# Patient Record
Sex: MALE | Race: WHITE
[De-identification: names, ages, dates, MRNs, and addresses within clinical notes are randomized per-mention and may not be internally consistent; named-entity substitution may affect disease eponyms.]

---

## 2017-07-29 ENCOUNTER — HOSPITAL ENCOUNTER (EMERGENCY)
Dept: HOSPITAL 58 - ED | Age: 27
Discharge: HOME | End: 2017-07-29

## 2017-07-29 VITALS — BODY MASS INDEX: 23.7 KG/M2

## 2017-07-29 VITALS — DIASTOLIC BLOOD PRESSURE: 80 MMHG | SYSTOLIC BLOOD PRESSURE: 157 MMHG | TEMPERATURE: 99.8 F

## 2017-07-29 DIAGNOSIS — F17.210: ICD-10-CM

## 2017-07-29 DIAGNOSIS — K08.89: Primary | ICD-10-CM

## 2017-07-29 PROCEDURE — 99282 EMERGENCY DEPT VISIT SF MDM: CPT

## 2017-07-29 NOTE — ED.PDOC
General


ED Provider: 


Dr. PILAR DICKERSON





Chief Complaint: Tooth Problem


Stated Complaint: tooth pain


Time Seen by Physician: 17:01


Mode of Arrival: Walk-In


Information Source: Patient


Exam Limitations: No limitations


Nursing and Triage Documentation Reviewed and Agree: Yes





EENT Complaint Exam





- Dental/Oral Complaint/Exam


Mechanism of Injury: No known trauma


Symptoms Are: Still present


Timing: Constant


Initial Severity: Moderate


Current Severity: Moderate


Character: Reports: Aching


Aggravating: Reports: None


Alleviating: Reports: None


Associated Signs and Symptoms: Denies: Swelling, Discharge, Fever, Foul odor, 

Foul taste in mouth


Related History: Reports: Similar episode


Cardiac Risk Factors: Reports: None


Dental/Oral Surgical History: Reports: None


Tooth Findings: Present: Normal findings


Cervical Lymphadenopathy Present: No


Facial Swelling Present: No


Bleeding Present: No


Oropharynx Findings: Absent: Clots, Active bleeding


Septal Hematoma: No


Foreign Body Present: No


Dysphagia Present: No


Drooling Present: No


Asymmetrical Tonsillar Swelling Present: No


Uvula Midline: No


Katie-tonsillar Fluctuence: No


Trismus Present: No


Palatal Petechiae Present: No


Scarlatinaform Rash Present: No


Teeth Picture: 


  __________________________














  __________________________





 1 - pain decay








Review of Systems





- Review Of Systems


Constitutional: Reports: No symptoms


Eyes: Reports: No symptoms


Ears, Nose, Mouth, Throat: Reports: No symptoms


Respiratory: Reports: No symptoms


Cardiac: Reports: No symptoms


GI: Reports: No symptoms


: Reports: No symptoms


Musculoskeletal: Reports: No symptoms


Skin: Reports: No symptoms


Neurological: Reports: No symptoms


Endocrine: Reports: No symptoms


Hematologic/Lymphatic: Reports: No symptoms


All Other Systems: Reviewed and Negative





Past Medical History





- Past Medical History


Previously Healthy: Yes


Endocrine: Reports: None


Cardiovascular: Reports: None


Respiratory: Reports: None


Hematological: Reports: None


Gastrointestinal: Reports: None


Genitourinary: Reports: None


Neuro/Psych: Reports: None


Musculoskeletal: Reports: None


Cancer: Reports: None





- Surgical History


General Surgical History: Reports: None





- Family History


Family History: Reports: None





- Social History


Smoking Status: Current every day smoker, Light tobacco smoker


Hx Substance Use: No


Alcohol Screening: Occasionally





- Immunizations


Tetanus Shot up to Date: Yes





Physical Exam





- Physical Exam


Appearance: Well-appearing, No pain distress, Well-nourished


Eyes: DELANEY, EOMI, Conjunctiva clear


ENT: Ears normal, Nose normal, Oropharynx normal


Respiratory: Airway patent, Breath sounds clear, Breath sounds equal, 

Respirations nonlabored


Cardiovascular: RRR, Pulses normal, No rub, No murmur


GI/: Soft, Nontender, No masses, Bowel sounds normal, No Organomegaly


Musculoskeletal: Normal strength, ROM intact, No edema, No calf tenderness


Skin: Warm, Dry, Normal color


Neurological: Sensation intact, Motor intact, Reflexes intact, Cranial nerves 

intact, Alert, Oriented


Psychiatric: Affect appropriate, Mood appropriate





Critical Care Note





- Critical Care Note


Total Time (mins): 0





Course





- Course


Vital Signs: 





 











  Temp Pulse Resp BP Pulse Ox


 


 07/29/17 16:50  99.8 F H  119 H  20  157/80 H  97














Departure





- Departure


Time of Disposition: 17:04


Disposition: HOME SELF-CARE


Discharge Problem: 


 Toothache





Instructions:  Toothache (ED)


Condition: Good


Pt referred to PMD for follow-up: Yes


Additional Instructions: 


Please call your Family Physician as soon as possible to schedule a follow-up 

appointment.


Allergies/Adverse Reactions: 


Allergies





No Known Allergies Allergy (Verified 07/29/17 16:57)


 








Home Medications: 


Ambulatory Orders





1 [No Reported Medications]  08/07/15 








Disposition Discussed With: Patient

## 2018-02-06 ENCOUNTER — HOSPITAL ENCOUNTER (EMERGENCY)
Dept: HOSPITAL 58 - ED | Age: 28
Discharge: HOME | End: 2018-02-06

## 2018-02-06 VITALS — BODY MASS INDEX: 22.3 KG/M2

## 2018-02-06 VITALS — DIASTOLIC BLOOD PRESSURE: 82 MMHG | TEMPERATURE: 97.7 F | SYSTOLIC BLOOD PRESSURE: 125 MMHG

## 2018-02-06 DIAGNOSIS — L01.00: Primary | ICD-10-CM

## 2018-02-06 DIAGNOSIS — F17.210: ICD-10-CM

## 2018-02-06 PROCEDURE — 99283 EMERGENCY DEPT VISIT LOW MDM: CPT

## 2018-02-06 PROCEDURE — 87070 CULTURE OTHR SPECIMN AEROBIC: CPT

## 2018-02-06 PROCEDURE — 87186 SC STD MICRODIL/AGAR DIL: CPT

## 2018-02-06 NOTE — ED.PDOC
General


ED Provider: 


Dr. QUENTIN KULKARNI





Chief Complaint: Nose Injury


Stated Complaint: Soreness Lt Nares at upper lip.  Onset /first noticed small 

area yesterday. Awakened this morning and area of lt nares opening was sore and 

painful. Denies recent illness. Denies cough or respiratory congestion.


Time Seen by Physician: 12:45


Mode of Arrival: Walk-In


Information Source: Patient


Exam Limitations: No limitations


Nursing and Triage Documentation Reviewed and Agree: Yes


Reviewed sepsis parameters & appropriate labs ordered?: Yes


System Inflammatory Response Syndrome: Not Applicable


Sepsis Protocol: 


For patient's 13 years and over:





Temp is 96.8 and below  and greater


Pulse >90 BPM


Resp >20/minute


Acutely Altered Mental Status





Are patient's symptoms suggestive of a new infection, such as:


   -Pneumonia


   -Skin, Soft Tissue


   -Endocarditis


   -UTI


   -Bone, Joint Infection


   -Implantable Device


   -Acute Abdominal Infection


   -Wound Infection


   -Meningitis


   -Blood Stream Catheter Infection


   -Unknown





System Inflammatory Response Syndrome: Not Applicable





EENT Complaint Exam





- Nasal Complaint/Exam


Symptoms Are: Still present


Timing: Constant


Initial Severity: Mild


Current Severity: Moderate


Location: Left


Aggravating: Reports: None


Alleviating: Reports: None


Associated Signs and Symptoms: Reports: Nasal discharge.  Denies: Nasal 

congestion, Bruising


Related History: Denies: Similar episode


Nasal Surgical History: Reports: None


Foreign Body Present: No


Septal Hematoma: No


Differential Diagnoses: Other (Impetigo R/O MRSA)





Review of Systems





- Review Of Systems


Constitutional: Reports: No symptoms


Eyes: Reports: No symptoms


Ears, Nose, Mouth, Throat: Reports: Nose pain


Respiratory: Reports: No symptoms


Cardiac: Reports: No symptoms


GI: Reports: No symptoms


: Reports: No symptoms


Musculoskeletal: Reports: No symptoms


Skin: Reports: No symptoms


Neurological: Reports: No symptoms


Endocrine: Reports: No symptoms (Crusting lesion to upper lip adjacent to nares)


Hematologic/Lymphatic: Reports: No symptoms


All Other Systems: Reviewed and Negative





Past Medical History





- Past Medical History


Previously Healthy: Yes


Endocrine: Reports: None


Cardiovascular: Reports: None


Respiratory: Reports: None


Hematological: Reports: None


Gastrointestinal: Reports: None


Genitourinary: Reports: None


Neuro/Psych: Reports: None


Musculoskeletal: Reports: None


Cancer: Reports: None





- Surgical History


General Surgical History: Reports: None





- Family History


Family History: Reports: None





- Social History


Smoking Status: Current every day smoker, Heavy tobacco smoker


Hx Substance Use: No


Alcohol Screening: Occasionally





Physical Exam





- Physical Exam


Appearance: Well-appearing, No pain distress, Well-nourished


Ill-appearing: Mild


Pain Distress: Mild


Eyes: DELANEY, EOMI, Conjunctiva clear


ENT: Ears normal, Nose normal (yellow crusting lesion at entry to lt nares and 

on upper lip- minimal erythrema-appearance of impetigo-nostrils not affected)


Neck: Supple


Respiratory: Airway patent, Breath sounds clear, Breath sounds equal


Cardiovascular: RRR, Pulses normal, No rub, No murmur


GI/: Soft, Nontender, No masses, Bowel sounds normal


Musculoskeletal: Normal strength, ROM intact


Skin: Warm, Dry, Normal color


Neurological: Sensation intact, Motor intact


Psychiatric: Affect appropriate, Mood appropriate





Critical Care Note





- Critical Care Note


Total Time (mins): 0





Course





- Course


Vital Signs: 





 











  Temp Pulse Resp BP Pulse Ox


 


 02/06/18 11:39  97.7 F  79  20  125/82  97














Departure





- Departure


Time of Disposition: 13:40


Disposition: HOME SELF-CARE


Discharge Problem: 


 Impetigo





Condition: Good


Pt referred to PMD for follow-up: Yes


IPMP verified?: No


Additional Instructions: 


Instructed patient to keep area clean and dry, cleansing with warm soap and 

water


Apply Bactroban 2-3 times daily 


Take oral antibiotic as directed 


Follow up ER earlier if worsens


Prescriptions: 


Cephalexin 500 mg PO BID #10 tablet


Mupirocin Calcium [Bactroban Nasal] 1 gm MEGAN TID #1 gm


Allergies/Adverse Reactions: 


Allergies





No Known Allergies Allergy (Verified 02/06/18 11:44)


 








Home Medications: 


Ambulatory Orders





Cephalexin 500 mg PO BID #10 tablet 02/06/18 


Mupirocin Calcium [Bactroban Nasal] 1 gm MEGAN TID #1 gm 02/06/18 








Disposition Discussed With: Patient


Additional Information: 





Instructions on treatment here with hibiclens and antibiotic ointment.

## 2018-05-17 ENCOUNTER — HOSPITAL ENCOUNTER (EMERGENCY)
Dept: HOSPITAL 58 - ED | Age: 28
Discharge: HOME | End: 2018-05-17
Payer: COMMERCIAL

## 2018-05-17 VITALS — BODY MASS INDEX: 24.3 KG/M2

## 2018-05-17 VITALS — SYSTOLIC BLOOD PRESSURE: 127 MMHG | DIASTOLIC BLOOD PRESSURE: 79 MMHG | TEMPERATURE: 98 F

## 2018-05-17 DIAGNOSIS — M54.5: ICD-10-CM

## 2018-05-17 DIAGNOSIS — S99.921A: ICD-10-CM

## 2018-05-17 DIAGNOSIS — M79.641: ICD-10-CM

## 2018-05-17 DIAGNOSIS — S40.212A: ICD-10-CM
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
## 2018-05-17 DIAGNOSIS — M25.512: ICD-10-CM

## 2018-05-17 DIAGNOSIS — R51: ICD-10-CM

## 2018-05-17 DIAGNOSIS — S50.812A: ICD-10-CM

## 2018-05-17 DIAGNOSIS — M54.2: ICD-10-CM

## 2018-05-17 DIAGNOSIS — F17.210: ICD-10-CM

## 2018-05-17 DIAGNOSIS — S53.402A: Primary | ICD-10-CM

## 2018-05-17 DIAGNOSIS — R07.89: ICD-10-CM

## 2018-05-17 DIAGNOSIS — M79.605: ICD-10-CM

## 2018-05-17 PROCEDURE — 99283 EMERGENCY DEPT VISIT LOW MDM: CPT
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
## 2018-08-18 ENCOUNTER — HOSPITAL ENCOUNTER (EMERGENCY)
Age: 28
Discharge: HOME | End: 2018-08-18
Attending: EMERGENCY MEDICINE
Payer: MEDICAID

## 2018-08-18 VITALS
HEART RATE: 95 BPM | RESPIRATION RATE: 13 BRPM | BODY MASS INDEX: 22.96 KG/M2 | OXYGEN SATURATION: 95 % | SYSTOLIC BLOOD PRESSURE: 128 MMHG | HEIGHT: 69 IN | TEMPERATURE: 99 F | WEIGHT: 155 LBS | DIASTOLIC BLOOD PRESSURE: 81 MMHG

## 2018-08-18 DIAGNOSIS — F19.90 DRUG USE: Primary | ICD-10-CM

## 2018-08-18 LAB
ACETAMINOPHEN LEVEL: <15 UG/ML
ALBUMIN SERPL-MCNC: 4.5 G/DL (ref 3.5–5.2)
ALP BLD-CCNC: 55 U/L (ref 40–130)
ALT SERPL-CCNC: 25 U/L (ref 5–41)
ANION GAP SERPL CALCULATED.3IONS-SCNC: 11 MMOL/L (ref 7–19)
AST SERPL-CCNC: 29 U/L (ref 5–40)
BASOPHILS ABSOLUTE: 0.1 K/UL (ref 0–0.2)
BASOPHILS RELATIVE PERCENT: 0.9 % (ref 0–1)
BILIRUB SERPL-MCNC: 0.8 MG/DL (ref 0.2–1.2)
BUN BLDV-MCNC: 16 MG/DL (ref 6–20)
CALCIUM SERPL-MCNC: 9.1 MG/DL (ref 8.6–10)
CHLORIDE BLD-SCNC: 102 MMOL/L (ref 98–111)
CO2: 25 MMOL/L (ref 22–29)
CREAT SERPL-MCNC: 1.2 MG/DL (ref 0.5–1.2)
EOSINOPHILS ABSOLUTE: 0.1 K/UL (ref 0–0.6)
EOSINOPHILS RELATIVE PERCENT: 0.9 % (ref 0–5)
ETHANOL: <10 MG/DL (ref 0–0.08)
GFR NON-AFRICAN AMERICAN: >60
GLUCOSE BLD-MCNC: 91 MG/DL (ref 74–109)
HCT VFR BLD CALC: 43.3 % (ref 42–52)
HEMOGLOBIN: 14.6 G/DL (ref 14–18)
LYMPHOCYTES ABSOLUTE: 3.1 K/UL (ref 1.1–4.5)
LYMPHOCYTES RELATIVE PERCENT: 38.3 % (ref 20–40)
MCH RBC QN AUTO: 31.2 PG (ref 27–31)
MCHC RBC AUTO-ENTMCNC: 33.7 G/DL (ref 33–37)
MCV RBC AUTO: 92.5 FL (ref 80–94)
MONOCYTES ABSOLUTE: 0.9 K/UL (ref 0–0.9)
MONOCYTES RELATIVE PERCENT: 11.6 % (ref 0–10)
NEUTROPHILS ABSOLUTE: 3.9 K/UL (ref 1.5–7.5)
NEUTROPHILS RELATIVE PERCENT: 48.2 % (ref 50–65)
PDW BLD-RTO: 13.1 % (ref 11.5–14.5)
PLATELET # BLD: 296 K/UL (ref 130–400)
PMV BLD AUTO: 10.1 FL (ref 9.4–12.4)
POTASSIUM SERPL-SCNC: 4.2 MMOL/L (ref 3.5–5)
RBC # BLD: 4.68 M/UL (ref 4.7–6.1)
SALICYLATE, SERUM: <3 MG/DL (ref 3–10)
SODIUM BLD-SCNC: 138 MMOL/L (ref 136–145)
TOTAL PROTEIN: 7 G/DL (ref 6.6–8.7)
WBC # BLD: 8 K/UL (ref 4.8–10.8)

## 2018-08-18 PROCEDURE — 85025 COMPLETE CBC W/AUTO DIFF WBC: CPT

## 2018-08-18 PROCEDURE — 36415 COLL VENOUS BLD VENIPUNCTURE: CPT

## 2018-08-18 PROCEDURE — 99284 EMERGENCY DEPT VISIT MOD MDM: CPT

## 2018-08-18 PROCEDURE — G0480 DRUG TEST DEF 1-7 CLASSES: HCPCS

## 2018-08-18 PROCEDURE — 99285 EMERGENCY DEPT VISIT HI MDM: CPT | Performed by: EMERGENCY MEDICINE

## 2018-08-18 PROCEDURE — 80053 COMPREHEN METABOLIC PANEL: CPT

## 2018-08-18 PROCEDURE — 93005 ELECTROCARDIOGRAM TRACING: CPT

## 2018-08-18 ASSESSMENT — ENCOUNTER SYMPTOMS
NAUSEA: 0
VOMITING: 0
CHEST TIGHTNESS: 0
BACK PAIN: 0
TROUBLE SWALLOWING: 0
WHEEZING: 0
DIARRHEA: 0
ABDOMINAL PAIN: 0
COLOR CHANGE: 0
CONSTIPATION: 0
EYE PAIN: 0
PHOTOPHOBIA: 0
SINUS PRESSURE: 0
SHORTNESS OF BREATH: 0

## 2018-08-18 ASSESSMENT — PAIN SCALES - GENERAL: PAINLEVEL_OUTOF10: 7

## 2018-08-18 ASSESSMENT — PAIN DESCRIPTION - FREQUENCY: FREQUENCY: CONTINUOUS

## 2018-08-18 ASSESSMENT — PAIN DESCRIPTION - PAIN TYPE: TYPE: ACUTE PAIN

## 2018-08-18 NOTE — ED NOTES
Bed: 09  Expected date:   Expected time:   Means of arrival:   Comments:  Charles Madison RN  08/18/18 [id number]
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
## 2018-08-18 NOTE — ED PROVIDER NOTES
[facility]  eMERGENCY dEPARTMENT eNCOUnter      Pt Name: Greta Oneal  MRN: [id number]  [place]arlagfzaira 1990  Date of evaluation: 8/18/2018  Provider: Rajinder Zavaleta MD    CHIEF COMPLAINT       Chief Complaint   Patient presents with    Drug Overdose         HISTORY OF PRESENT ILLNESS   (Location/Symptom, Timing/Onset, Context/Setting, Quality, Duration, Modifying Factors, Severity)  Note limiting factors. Greta Oneal is a 29 y.o. male who presents to the emergency department for tells me that he did some meth early yesterday. He felt guilty for doing it the fist time. He then called a friend who then gave him some valium to help calm him down. Pt then became lethargic at home and GF called EMS. Pt states that he feels fine at this time and would like to go home. He was not trying to hurt himself. He is not having compaints at this time. HPI    Nursing Notes were reviewed. REVIEW OF SYSTEMS    (2-9 systems for level 4, 10 or more for level 5)     Review of Systems   Constitutional: Negative for fatigue and fever. HENT: Negative for congestion, drooling, sinus pressure and trouble swallowing. Eyes: Negative for photophobia, pain and visual disturbance. Respiratory: Negative for chest tightness, shortness of breath and wheezing. Cardiovascular: Negative for chest pain, palpitations and leg swelling. Gastrointestinal: Negative for abdominal pain, constipation, diarrhea, nausea and vomiting. Genitourinary: Negative for dysuria, flank pain and urgency. Musculoskeletal: Negative for arthralgias, back pain, myalgias, neck pain and neck stiffness. Skin: Negative for color change, pallor and rash. Neurological: Negative for dizziness, facial asymmetry, speech difficulty, weakness, light-headedness, numbness and headaches. Psychiatric/Behavioral: Negative for agitation, confusion, hallucinations and suicidal ideas. The patient is not nervous/anxious.              PAST MEDICAL mood appears anxious. His speech is rapid and/or pressured and tangential. He is hyperactive. Cognition and memory are not impaired. He expresses impulsivity. Nursing note and vitals reviewed. DIAGNOSTIC RESULTS     EKG: All EKG's are interpreted by the Emergency Department Physician who either signs or Co-signs this chart in the absence of a cardiologist.    Sinus tachycardia with a rate of 100, normal axis, no acute ST elevations or depressions noted. RADIOLOGY:   Non-plain film images such as CT, Ultrasound and MRI are read by the radiologist. Plain radiographic images are visualized and preliminarily interpreted by the emergency physician with the below findings:          No orders to display           LABS:  Labs Reviewed   CBC WITH AUTO DIFFERENTIAL - Abnormal; Notable for the following:        Result Value    RBC 4.68 (*)     MCH 31.2 (*)     Neutrophils % 48.2 (*)     Monocytes % 11.6 (*)     All other components within normal limits   ACETAMINOPHEN LEVEL   COMPREHENSIVE METABOLIC PANEL   ETHANOL   SALICYLATE LEVEL   URINE DRUG SCREEN   URINE RT REFLEX TO CULTURE       All other labs were within normal range or not returned as of this dictation. EMERGENCY DEPARTMENT COURSE and DIFFERENTIAL DIAGNOSIS/MDM:   Vitals:    Vitals:    08/18/18 0137   BP: 128/81   Pulse: 95   Resp: 13   Temp: 99 °F (37.2 °C)   TempSrc: Temporal   SpO2: 95%   Weight: 155 lb (70.3 kg)   Height: 5' 9\" (1.753 m)       MDM  Number of Diagnoses or Management Options  Drug use:   Diagnosis management comments: Patient tells me he feels better and would like to leave. He is a little anxious, but he is afraid that we are going to call PD on him since he is on parole. I explained to him that that was not going to happen. He has no complaints at this time. He has a little anxious and tangential, but he used methamphetamines. He is not homicidal or suicidal. He has no danger to himself or others.     The patient can be

## 2018-08-22 LAB
EKG P AXIS: 75 DEGREES
EKG P-R INTERVAL: 132 MS
EKG Q-T INTERVAL: 330 MS
EKG QRS DURATION: 86 MS
EKG QTC CALCULATION (BAZETT): 398 MS
EKG T AXIS: 49 DEGREES

## 2018-09-17 NOTE — ED.PDOC
General


ED Provider: 


Dr. PILAR DICKERSON





Chief Complaint: Respiratory Complaint


Stated Complaint: cough, wheezing


Time Seen by Physician: 17:25 (seen with vania at all times smokes 1/2 pk/day)


Mode of Arrival: Walk-In


Information Source: Patient


Exam Limitations: No limitations


Nursing and Triage Documentation Reviewed and Agree: Yes


Does patient meet sepsis criteria?: No


System Inflammatory Response Syndrome: Not Applicable


Sepsis Protocol: 


For patient's 13 years and over:





Temp is 96.8 and below  and greater


Pulse >90 BPM


Resp >20/minute


Acutely Altered Mental Status





Are patient's symptoms suggestive of a new infection, such as:


   -Pneumonia


   -Skin, Soft Tissue


   -Endocarditis


   -UTI


   -Bone, Joint Infection


   -Implantable Device


   -Acute Abdominal Infection


   -Wound Infection


   -Meningitis


   -Blood Stream Catheter Infection


   -Unknown








Respiratory Complaint Exam





- Respiratory Complaint/Exam


Symptoms Are: Still present


Timing: Intermittent


Initial Severity: Mild


Current Severity: Mild


Location: Nose, Throat, Chest


Character: Reports: Non-productive cough, Dry cough


Aggravating: Reports: URI


Associated Signs and Symptoms: Denies: Rapid breathing, Dyspnea, Fever, Chills, 

Chest pain, Pleuritic chest pain, Wheezing, Hemoptysis, Dizziness, Calf pain, 

Calf swelling, Edema, URI, Nasal congestion, Hoarseness, Sinus discomfort, 

Vomiting, Sore throat, Weight loss, Decreased oral intake, Increased thirst, 

Increased appetite, Increased urination


Related History: Reports: Similar episode


History of Healthcare-Acquired Pneumonia: No


Related Surgical History: Reports: None


Pulmonary Embolism Risk Factors: None


Cardiac Risk Factors: Reports: None


Pseudomonas Risk Factors: Reports: None.  Denies: Chronic steriod use


Tuberculosis Risk Factors: Reports: None


Status Asthmaticus Risk Factors: Reports: None


Home Oxygen Use: No


Recent Stress Test: No


Recent Echo/LV Function: No


Current Antibiotic Use: No


Current Asthma Medication Use: No


Respiratory Distress: None


Inadequate Respiratory Effort: No


Dysphagia Present: No


Stridor Present: No


JVD Present: No


Accessory Muscle Use: No


Retractions: Not Present


Diminished Breath Sounds: No


Sinus Tenderness: None


Grunting Respirations: No


Kussmaul Respirations: No


Differential Diagnoses: Pneumonia, Bronchitis





Review of Systems





- Review Of Systems


Constitutional: Reports: No symptoms


Eyes: Reports: No symptoms


Ears, Nose, Mouth, Throat: Reports: No symptoms


Respiratory: Reports: Cough


Cardiac: Reports: No symptoms


GI: Reports: No symptoms


: Reports: No symptoms


Musculoskeletal: Reports: No symptoms


Skin: Reports: No symptoms


Neurological: Reports: No symptoms


Endocrine: Reports: No symptoms


Hematologic/Lymphatic: Reports: No symptoms


All Other Systems: Reviewed and Negative





Past Medical History





- Past Medical History


Previously Healthy: Yes


Endocrine: Reports: None


Cardiovascular: Reports: None


Respiratory: Reports: None


Hematological: Reports: None


Gastrointestinal: Reports: None


Genitourinary: Reports: None


Neuro/Psych: Reports: None


Musculoskeletal: Reports: None


Cancer: Reports: None





- Surgical History


General Surgical History: Reports: None





- Family History


Family History: Reports: None





- Social History


Smoking Status: Current every day smoker, Light tobacco smoker


Hx Substance Use: No


Alcohol Screening: None





Physical Exam





- Physical Exam


Appearance: Well-appearing, No pain distress, Well-nourished


Eyes: DELANEY, EOMI, Conjunctiva clear


ENT: Ears normal, Nose normal, Oropharynx normal


Respiratory: Airway patent, Breath sounds clear, Breath sounds equal, 

Respirations nonlabored


Cardiovascular: RRR, Pulses normal, No rub, No murmur


GI/: Soft, Nontender, No masses, Bowel sounds normal, No Organomegaly


Musculoskeletal: Normal strength, ROM intact, No edema, No calf tenderness


Skin: Warm, Dry, Normal color


Neurological: Sensation intact, Motor intact, Reflexes intact, Cranial nerves 

intact, Alert, Oriented


Psychiatric: Affect appropriate, Mood appropriate





Interpretation





- Radiology Interpretation


Radiology Interpretation By: ED Physician


Radiology Results: Negative


Exam Interpreted: CXR (for pneumonia)





Critical Care Note





- Critical Care Note


Total Time (mins): 0





Course





- Course


Orders, Labs, Meds: 





Orders











 Category Date Time Status


 


 CHEST, 2 VIEWS PA & LAT Stat RADS  09/17/18 17:38 Ordered











Vital Signs: 





 











  Temp Pulse Resp BP Pulse Ox


 


 09/17/18 17:22  99.3 F  99 H  16  124/78  95














Departure





- Departure


Time of Disposition: 19:00


Disposition: HOME SELF-CARE


Discharge Problem: 


 Bronchitis





Instructions:  Acute Bronchitis (ED), Wheezing (ED), How Your Lungs Work (ED), 

Effects of Smoking, Alcohol, and Medicines on Breastfeeding (ED), How to Stop 

Smoking (ED), Secondhand Smoke Exposure in Children (ED)


Condition: Good


Pt referred to PMD for follow-up: Yes


IPMP verified?: No


Additional Instructions: 


Please call your Family Physician as soon as possible to schedule a follow-up 

appointment.


Allergies/Adverse Reactions: 


Allergies





No Known Allergies Allergy (Verified 09/17/18 17:29)


 








Home Medications: 


Ambulatory Orders





1 [No Reported Medications]  05/17/18

## 2018-10-19 NOTE — ED.PDOC
General


ED Provider: 


Dr. RUDY ALAMO





Chief Complaint: Tooth Problem


Stated Complaint: Patient is a 28 year old male who started having severe right 

lower molar tooth ache took aspirin but has not improved. Does not have a 

dentist.


Time Seen by Physician: 00:01


Mode of Arrival: Walk-In


Information Source: Patient


Seen Within Last 72 Hours for Same Complaint By: ED


Nursing and Triage Documentation Reviewed and Agree: Yes


Does patient meet sepsis criteria?: No


System Inflammatory Response Syndrome: Not Applicable


Sepsis Protocol: 


For patient's 13 years and over:





Temp is 96.8 and below  and greater


Pulse >90 BPM


Resp >20/minute


Acutely Altered Mental Status





Are patient's symptoms suggestive of a new infection, such as:


   -Pneumonia


   -Skin, Soft Tissue


   -Endocarditis


   -UTI


   -Bone, Joint Infection


   -Implantable Device


   -Acute Abdominal Infection


   -Wound Infection


   -Meningitis


   -Blood Stream Catheter Infection


   -Unknown








EENT Complaint Exam





- Dental/Oral Complaint/Exam


Mechanism of Injury: No known trauma


Onset/Duration: 3 days 


Symptoms Are: Still present


Timing: Constant


Initial Severity: Severe


Current Severity: Severe


Location: right lower molar


Character: Reports: Aching, Throbbing


Aggravating: Reports: Heat, Cold, Chewing


Associated Signs and Symptoms: Reports: Swelling, Discharge, Foul odor, Foul 

taste in mouth


Tooth Findings: Present: Gross decay, Gross caries, Abcess


Cervical Lymphadenopathy Present: Yes





Review of Systems





- Review Of Systems


Constitutional: Reports: No symptoms


Eyes: Reports: No symptoms


Ears, Nose, Mouth, Throat: Reports: Mouth pain, Mouth swelling


Respiratory: Reports: No symptoms


Cardiac: Reports: No symptoms


GI: Reports: No symptoms


: Reports: No symptoms


Musculoskeletal: Reports: No symptoms


Skin: Reports: No symptoms


Neurological: Reports: No symptoms


Endocrine: Reports: No symptoms


Hematologic/Lymphatic: Reports: No symptoms


All Other Systems: Reviewed and Negative





Past Medical History





- Past Medical History


Previously Healthy: Yes


Endocrine: Reports: None


Cardiovascular: Reports: None


Respiratory: Reports: None


Hematological: Reports: None


Gastrointestinal: Reports: None


Genitourinary: Reports: None


Neuro/Psych: Reports: None


Musculoskeletal: Reports: Joint Pain (INFECTION RIGHT THUMB)


Cancer: Reports: None


Other Pertinent Past Medical History: BOXERS FRACTURES TO BOTH HANDS





- Surgical History


General Surgical History: Reports: None





- Family History


Family History: Reports: None





- Social History


Smoking Status: Current every day smoker, Light tobacco smoker


Hx Substance Use: No


Alcohol Screening: None





- Immunizations


Tetanus Shot up to Date: Yes





Physical Exam





- Physical Exam


Appearance: Ill-appearing


Ill-appearing: Mild


Pain Distress: Severe


Eyes: DELANEY, EOMI, Conjunctiva clear


ENT: Ears normal, Nose normal, Oropharynx normal


Neck: Supple


Respiratory: Airway patent, Breath sounds clear, Breath sounds equal, 

Respirations nonlabored


Cardiovascular: RRR, Pulses normal, No rub, No murmur


Skin: Warm, Dry, Normal color


Neurological: Sensation intact, Motor intact, Reflexes intact, Cranial nerves 

intact, Alert, Oriented


Psychiatric: Anxious





Critical Care Note





- Critical Care Note


Total Time (mins): 0





Course





- Course


Orders, Labs, Meds: 





Orders











 Category Date Time Status


 


 Clindamycin HCl [Cleocin] MEDS  10/19/18 00:01 Stat





 300 mg PO ONCE STA   


 


 Ketorolac Tromethamine [Toradol] MEDS  10/19/18 00:01 Stat





 60 mg IM ONCE STA   











Vital Signs: 





 











  Temp Pulse Resp BP Pulse Ox


 


 10/18/18 23:18  98.6 F  87  20  134/83  97














Departure





- Departure


Time of Disposition: 00:40


Disposition: HOME SELF-CARE


Discharge Problem: 


 Dental abscess, Toothache





Instructions:  Dental Abscess (ED)


Condition: Fair


Pt referred to PMD for follow-up: Yes


IPMP verified?: No


Additional Instructions: 


Take medications as prescribed 


Follow up with PCP in 3 days 





Prescriptions: 


Hydrocodone Bit/Acetaminophen [Norco 5-325] 1 each PO Q6HR PRN #15 tablet


 PRN Reason: severe pain 


Clindamycin HCl 300 mg PO TID #30 capsule


Ibuprofen [Motrin] 600 mg PO Q6H PRN #30 tablet


 PRN Reason: Analgesia


Allergies/Adverse Reactions: 


Allergies





No Known Allergies Allergy (Verified 10/18/18 23:14)


 








Home Medications: 


Ambulatory Orders





Clindamycin HCl 300 mg PO TID #30 capsule 10/19/18 


Hydrocodone Bit/Acetaminophen [Norco 5-325] 1 each PO Q6HR PRN #15 tablet 10/19/

18 


Ibuprofen [Motrin] 600 mg PO Q6H PRN #30 tablet 10/19/18 








Disposition Discussed With: Patient

## 2018-11-01 NOTE — ED.PDOC
General


ED Provider: 


Dr. RUDY ALAMO





Chief Complaint: Tooth Problem


Stated Complaint: Patient returns with similar dental pain. Has not seen a 

dentist. Does not have a Primary care doctor. Finished antibiotics given 

recently


Time Seen by Physician: 21:54


Mode of Arrival: Walk-In


Information Source: Patient


Exam Limitations: No limitations


Nursing and Triage Documentation Reviewed and Agree: Yes


Does patient meet sepsis criteria?: No


System Inflammatory Response Syndrome: Not Applicable


Sepsis Protocol: 


For patient's 13 years and over:





Temp is 96.8 and below  and greater


Pulse >90 BPM


Resp >20/minute


Acutely Altered Mental Status





Are patient's symptoms suggestive of a new infection, such as:


   -Pneumonia


   -Skin, Soft Tissue


   -Endocarditis


   -UTI


   -Bone, Joint Infection


   -Implantable Device


   -Acute Abdominal Infection


   -Wound Infection


   -Meningitis


   -Blood Stream Catheter Infection


   -Unknown








EENT Complaint Exam





- Dental/Oral Complaint/Exam


Mechanism of Injury: No known trauma


Onset/Duration: 2 weeks 


Symptoms Are: Still present


Timing: Constant


Initial Severity: Severe


Current Severity: Severe


Character: Reports: Dull, Aching, Throbbing


Aggravating: Reports: Heat, Cold, Chewing


Associated Signs and Symptoms: Denies: Swelling, Discharge, Fever, Foul odor, 

Foul taste in mouth


Related History: Reports: Similar episode


Cardiac Risk Factors: Reports: None


Dental/Oral Surgical History: Reports: None


Tooth Findings: Present: Gross decay, Dental fracture


Facial Swelling Present: No


Bleeding Present: No


Oropharynx Findings: Absent: Clots, Active bleeding


Septal Hematoma: No


Foreign Body Present: No


Dysphagia Present: No


Drooling Present: No


Asymmetrical Tonsillar Swelling Present: No


Uvula Midline: No


Katie-tonsillar Fluctuence: No


Trismus Present: No


Palatal Petechiae Present: No


Scarlatinaform Rash Present: No


Lesions: Absent: Lip, Gums, Tongue, Buccal Mucosa, Pharynx


Exanthem: Absent: Lip, Gums, Tongue, Buccal Mucosa, Pharynx


Vesicles: Absent: Lip, Gums, Tongue, Buccal Mucosa, Pharynx


Teeth Picture: 


  __________________________














  __________________________





 1 - tenderness





 2 - fractured partially





Differential Diagnoses: Dental Caries, Fractured Tooth





Review of Systems





- Review Of Systems


Constitutional: Reports: No symptoms


Eyes: Reports: No symptoms


Ears, Nose, Mouth, Throat: Reports: Mouth pain


Respiratory: Reports: No symptoms


Cardiac: Reports: No symptoms


GI: Reports: No symptoms


: Reports: No symptoms


Musculoskeletal: Reports: No symptoms


Skin: Reports: No symptoms


Neurological: Reports: No symptoms


Endocrine: Reports: No symptoms


Hematologic/Lymphatic: Reports: No symptoms


All Other Systems: Reviewed and Negative





Past Medical History





- Past Medical History


Previously Healthy: Yes


Endocrine: Reports: None


Cardiovascular: Reports: None


Respiratory: Reports: None


Hematological: Reports: None


Gastrointestinal: Reports: None


Genitourinary: Reports: None


Neuro/Psych: Reports: None


Musculoskeletal: Reports: Joint Pain (INFECTION RIGHT THUMB)


Cancer: Reports: None


Other Pertinent Past Medical History: BOXERS FRACTURES TO BOTH HANDS





- Surgical History


General Surgical History: Reports: None





- Family History


Family History: Reports: None





- Social History


Smoking Status: Current every day smoker, Light tobacco smoker


Hx Substance Use: No


Alcohol Screening: None





- Immunizations


Tetanus Shot up to Date: Yes





Physical Exam





- Physical Exam


Appearance: Ill-appearing


Ill-appearing: Mild


Pain Distress: Severe


Eyes: DELANEY, EOMI, Conjunctiva clear


Neck: Supple


Respiratory: Airway patent, Breath sounds clear, Breath sounds equal, 

Respirations nonlabored


Cardiovascular: RRR, Pulses normal, No rub, No murmur


GI/: Soft, Nontender, No masses, Bowel sounds normal, No Organomegaly


Psychiatric: Anxious





Critical Care Note





- Critical Care Note


Total Time (mins): 0





Course





- Course


Vital Signs: 





 











  Temp Pulse Resp BP Pulse Ox


 


 11/01/18 21:43  98.6 F  83  20  138/80  97














Departure





- Departure


Time of Disposition: 22:05


Disposition: HOME SELF-CARE


Discharge Problem: 


 Toothache





Instructions:  Toothache (ED)


Condition: Stable


Pt referred to PMD for follow-up: Yes


IPMP verified?: No


Additional Instructions: 


Take medications as prescribed 


Follow up with the dentist in 2 days 


Prescriptions: 


Ibuprofen 800 mg PO TID #60 tablet


Allergies/Adverse Reactions: 


Allergies





No Known Allergies Allergy (Verified 11/01/18 21:48)


 








Home Medications: 


Ambulatory Orders





Ibuprofen [Motrin] 600 mg PO Q6H PRN #30 tablet 10/19/18 


Ibuprofen 800 mg PO TID #60 tablet 11/01/18 








Disposition Discussed With: Patient

## 2018-11-10 NOTE — ED.PDOC
General


ED Provider: 


Dr. RUDY ALAMO





Chief Complaint: Respiratory Complaint


Stated Complaint: Pateint is a 28 year old male who has upper respratory 

complaints, states the wife has the same thing and she is at home. He is a 

smoker and has a bad molar and sinus conjestion.


Time Seen by Physician: 14:09


Mode of Arrival: Walk-In


Information Source: Patient


Exam Limitations: No limitations


Primary Care Provider: 


ERA STUBBS





Nursing and Triage Documentation Reviewed and Agree: Yes


Does patient meet sepsis criteria?: No


System Inflammatory Response Syndrome: Not Applicable


Sepsis Protocol: 


For patient's 13 years and over:





Temp is 96.8 and below  and greater


Pulse >90 BPM


Resp >20/minute


Acutely Altered Mental Status





Are patient's symptoms suggestive of a new infection, such as:


   -Pneumonia


   -Skin, Soft Tissue


   -Endocarditis


   -UTI


   -Bone, Joint Infection


   -Implantable Device


   -Acute Abdominal Infection


   -Wound Infection


   -Meningitis


   -Blood Stream Catheter Infection


   -Unknown








Respiratory Complaint Exam





- Respiratory Complaint/Exam


Onset/Duration: 2 days


Symptoms Are: Still present


Timing: Constant


Initial Severity: Moderate


Current Severity: Moderate


Location: Chest


Character: Reports: Productive cough


Aggravating: Reports: URI, Weather


Related Surgical History: Reports: None


Pulmonary Embolism Risk Factors: None


Cardiac Risk Factors: Reports: None


Pseudomonas Risk Factors: Reports: None


Tuberculosis Risk Factors: Reports: None


Status Asthmaticus Risk Factors: Reports: None


Home Oxygen Use: No


Recent Stress Test: No


Recent Echo/LV Function: No


Current Antibiotic Use: No


Current Asthma Medication Use: No


Respiratory Distress: None


Inadequate Respiratory Effort: No


Dysphagia Present: No


Stridor Present: No


JVD Present: No


Accessory Muscle Use: No


Retractions: Not Present


Diminished Breath Sounds: No


Sinus Tenderness: Maxillary


Grunting Respirations: No


Kussmaul Respirations: No


Differential Diagnoses: Bronchitis, Other (sinusitis )





Review of Systems





- Review Of Systems


Constitutional: Reports: No symptoms


Eyes: Reports: No symptoms


Respiratory: Reports: Cough


Cardiac: Reports: No symptoms


GI: Reports: No symptoms


: Reports: No symptoms


Musculoskeletal: Reports: No symptoms


Neurological: Reports: Anxiety


Endocrine: Reports: No symptoms


All Other Systems: Reviewed and Negative





Past Medical History





- Past Medical History


Previously Healthy: Yes


Endocrine: Reports: None


Cardiovascular: Reports: None


Respiratory: Reports: None


Hematological: Reports: None


Gastrointestinal: Reports: None


Genitourinary: Reports: None


Neuro/Psych: Reports: None


Musculoskeletal: Reports: Joint Pain (INFECTION RIGHT THUMB)


Cancer: Reports: None


Other Pertinent Past Medical History: BOXERS FRACTURES TO BOTH HANDS





- Surgical History


General Surgical History: Reports: None





- Family History


Family History: Reports: None





- Social History


Smoking Status: Current every day smoker, Light tobacco smoker


Hx Substance Use: No


Alcohol Screening: None





Physical Exam





- Physical Exam


Appearance: Ill-appearing


Ill-appearing: Mild


Eyes: DELANEY, EOMI, Conjunctiva clear


ENT: Ears normal, Nose normal, Oropharynx normal


Neck: Supple


Respiratory: Breath sounds diminished, Wheezes (mild on the left )


Musculoskeletal: Normal strength


Skin: Warm, Dry, Normal color


Neurological: Sensation intact, Motor intact, Reflexes intact, Cranial nerves 

intact, Alert, Oriented


Psychiatric: Anxious





Critical Care Note





- Critical Care Note


Total Time (mins): 0





Course





- Course


Vital Signs: 


 











  Temp Pulse Resp BP Pulse Ox


 


 11/10/18 13:55  97.1 F L  77  18  160/72 H  98














Departure





- Departure


Time of Disposition: 14:31


Disposition: HOME SELF-CARE


Discharge Problem: 


Acute maxillary sinusitis, unspecified


Qualifiers:


 Recurrence: non-recurrent Qualified Code(s): J01.00 - Acute maxillary sinusitis

, unspecified





Instructions:  Sinusitis (ED)


Condition: Stable


Pt referred to PMD for follow-up: Yes


IPMP verified?: No


Additional Instructions: 


Take medications as prescribed. 


Follow up with PCP in 3 days


Quit smoking. 


Prescriptions: 


Azithromycin [Zithromax] 250 mg PO AS DIRECTED #6 tablet


Prednisone 20 mg PO DAILYWM #5 tablet


Allergies/Adverse Reactions: 


Allergies





No Known Allergies Allergy (Verified 11/10/18 14:02)


 








Home Medications: 


Ambulatory Orders





Azithromycin [Zithromax] 250 mg PO AS DIRECTED #6 tablet 11/10/18 


Ibuprofen [Motrin] 800 mg PO Q6H 11/10/18 


Prednisone 20 mg PO DAILYWM #5 tablet 11/10/18 








Disposition Discussed With: Patient

## 2019-03-11 NOTE — ED.PDOC
General


ED Provider: 


Dr. QUENTIN HANSEN-ER





Chief Complaint: Tooth Problem


Stated Complaint: my gums are swollen---i need some antbx


Time Seen by Physician: 22:54


Mode of Arrival: Walk-In


Information Source: Patient


Exam Limitations: No limitations


Primary Care Provider: 


ERA STUBBS





Nursing and Triage Documentation Reviewed and Agree: Yes


Does patient meet sepsis criteria?: No


System Inflammatory Response Syndrome: Not Applicable


Sepsis Protocol: 


For patient's 13 years and over:





Temp is 96.8 and below  and greater


Pulse >90 BPM


Resp >20/minute


Acutely Altered Mental Status





Are patient's symptoms suggestive of a new infection, such as:


   -Pneumonia


   -Skin, Soft Tissue


   -Endocarditis


   -UTI


   -Bone, Joint Infection


   -Implantable Device


   -Acute Abdominal Infection


   -Wound Infection


   -Meningitis


   -Blood Stream Catheter Infection


   -Unknown








EENT Complaint Exam





- Dental/Oral Complaint/Exam


Mechanism of Injury: Unknown


Onset/Duration: 3 days


Symptoms Are: Still present


Timing: Constant


Initial Severity: Mild


Current Severity: Moderate


Location: right upper premolar


Character: Reports: Dull, Aching, Throbbing


Aggravating: Reports: Heat, Cold, Chewing


Associated Signs and Symptoms: Reports: Swelling


Cervical Lymphadenopathy Present: No


Facial Swelling Present: No


Bleeding Present: No


Septal Hematoma: No


Foreign Body Present: No


Dysphagia Present: No


Drooling Present: No


Asymmetrical Tonsillar Swelling Present: No


Uvula Midline: Yes


Katie-tonsillar Fluctuence: No


Trismus Present: No


Palatal Petechiae Present: No


Scarlatinaform Rash Present: No


Differential Diagnoses: Dental Abcess, Dental Caries





Review of Systems





- Review Of Systems


Constitutional: Reports: No symptoms


Eyes: Reports: No symptoms


Ears, Nose, Mouth, Throat: Reports: Mouth pain, Mouth swelling


Respiratory: Reports: No symptoms


Cardiac: Reports: No symptoms


GI: Reports: No symptoms


: Reports: No symptoms


Musculoskeletal: Reports: No symptoms


Skin: Reports: No symptoms


Neurological: Reports: No symptoms


Endocrine: Reports: No symptoms


Hematologic/Lymphatic: Reports: No symptoms


All Other Systems: Reviewed and Negative





Past Medical History





- Past Medical History


Previously Healthy: Yes


Endocrine: Reports: None


Cardiovascular: Reports: None


Respiratory: Reports: None


Hematological: Reports: None


Gastrointestinal: Reports: None


Genitourinary: Reports: None


Neuro/Psych: Reports: None


Musculoskeletal: Reports: Joint Pain (INFECTION RIGHT THUMB)


Cancer: Reports: None


Other Pertinent Past Medical History: BOXERS FRACTURES TO BOTH HANDS





- Surgical History


General Surgical History: Reports: None





- Family History


Family History: Reports: None





- Social History


Smoking Status: Current every day smoker, Heavy tobacco smoker


Hx Substance Use: No


Alcohol Screening: None





- Immunizations


Tetanus Shot up to Date: Yes





Physical Exam





- Physical Exam


Appearance: Well-appearing


Pain Distress: Mild


Eyes: DELANEY, EOMI, Conjunctiva clear


ENT: Ears normal, Nose normal, Oropharynx normal


Neck: Supple


Respiratory: Airway patent, Breath sounds clear, Breath sounds equal, 

Respirations nonlabored


Cardiovascular: RRR, Pulses normal, No rub, No murmur


GI/: Soft, Nontender, No masses, Bowel sounds normal, No Organomegaly


Musculoskeletal: Normal strength, ROM intact, No edema, No calf tenderness


Skin: Warm, Dry, Normal color


Neurological: Sensation intact, Motor intact, Reflexes intact, Cranial nerves 

intact, Alert, Oriented


Psychiatric: Affect appropriate, Mood appropriate





Critical Care Note





- Critical Care Note


Total Time (mins): 0





Course





- Course


Vital Signs: 





 











  Temp Pulse Resp BP Pulse Ox


 


 03/11/19 22:39  99 F  119 H  18  148/87 H  96














Departure





- Departure


Time of Disposition: 22:55


Disposition: HOME SELF-CARE


Discharge Problem: 


 Dental caries





Instructions:  Toothache (ED)


Condition: Good


Pt referred to PMD for follow-up: Yes


IPMP verified?: No


Additional Instructions: 


augmentin 875mg bid x 7days---f/u with dentist as needed


Allergies/Adverse Reactions: 


Allergies





No Known Allergies Allergy (Verified 03/11/19 22:47)


 








Home Medications: 


Ambulatory Orders





1 [No Reported Medications]  12/02/18 








Disposition Discussed With: Patient

## 2019-04-04 NOTE — ED.PDOC
General


ED Provider: 


Dr. QUENTIN KULKARNI





Chief Complaint: Non-specific Complaint


Stated Complaint: Tooth ache.  Pt c/o swelling to rt side of jaw/neck.  Thinks 

may be due to same.Hx dental pain. Had referral from Dr Martinez, dentist, but has 

lost the paper.  Persistent and recurrent. States last visit did not get 

Prescrption filled-lost prescription


Time Seen by Physician: 09:55


Mode of Arrival: Walk-In


Information Source: Patient


Exam Limitations: No limitations


Primary Care Provider: 


ERA STUBBS





Nursing and Triage Documentation Reviewed and Agree: Yes


Does patient meet sepsis criteria?: No


System Inflammatory Response Syndrome: Not Applicable


Sepsis Protocol: 


For patient's 13 years and over:





Temp is 96.8 and below  and greater


Pulse >90 BPM


Resp >20/minute


Acutely Altered Mental Status





Are patient's symptoms suggestive of a new infection, such as:


   -Pneumonia


   -Skin, Soft Tissue


   -Endocarditis


   -UTI


   -Bone, Joint Infection


   -Implantable Device


   -Acute Abdominal Infection


   -Wound Infection


   -Meningitis


   -Blood Stream Catheter Infection


   -Unknown








EENT Complaint Exam





- Dental/Oral Complaint/Exam


Mechanism of Injury: No known trauma


Onset/Duration: for some time, currently 5 days


Symptoms Are: Still present


Initial Severity: Moderate


Current Severity: Moderate


Location: Upper lt post molar


Character: Reports: Aching, Throbbing


Aggravating: Reports: Cold, Chewing, Exertion


Alleviating: Reports: None


Associated Signs and Symptoms: Reports: Swelling, Foul taste in mouth


Related History: Reports: Similar episode


Cardiac Risk Factors: Reports: None


Dental/Oral Surgical History: Reports: None


Tooth Findings: Present: Percussion tenderness, Gross decay, Gross caries, 

Dental fracture, Cellulitis


Cervical Lymphadenopathy Present: No


Facial Swelling Present: No


Bleeding Present: No


Oropharynx Findings: Present: Active bleeding


Septal Hematoma: No


Foreign Body Present: No


Dysphagia Present: No


Drooling Present: No


Asymmetrical Tonsillar Swelling Present: No


Uvula Midline: Yes


Katie-tonsillar Fluctuence: No


Trismus Present: No


Palatal Petechiae Present: No


Scarlatinaform Rash Present: No


Lesions: Absent: Buccal Mucosa, Pharynx


Exanthem: Absent: Buccal Mucosa, Pharynx


Vesicles: Absent: Buccal Mucosa, Pharynx


Differential Diagnoses: Dental Abcess, Dental Caries





Review of Systems





- Review Of Systems


Constitutional: Reports: No symptoms


Eyes: Reports: No symptoms


Ears, Nose, Mouth, Throat: Reports: No symptoms, Mouth pain, Loose teeth


Respiratory: Reports: No symptoms


Cardiac: Reports: No symptoms


GI: Reports: No symptoms


: Reports: No symptoms


Musculoskeletal: Reports: No symptoms


Skin: Reports: No symptoms


Neurological: Reports: No symptoms


Endocrine: Reports: No symptoms


Hematologic/Lymphatic: Reports: No symptoms


All Other Systems: Reviewed and Negative





Past Medical History





- Past Medical History


Previously Healthy: No (Yes other than chronic dental issues)


Endocrine: Reports: None


Cardiovascular: Reports: None


Respiratory: Reports: None


Hematological: Reports: None


Gastrointestinal: Reports: None


Genitourinary: Reports: None


Neuro/Psych: Reports: None


Musculoskeletal: Reports: Joint Pain (INFECTION RIGHT THUMB)


Cancer: Reports: None


Other Pertinent Past Medical History: BOXERS FRACTURES TO BOTH HANDS





- Surgical History


General Surgical History: Reports: None





- Family History


Family History: Reports: None





- Social History


Smoking Status: Current every day smoker, Heavy tobacco smoker


Hx Substance Use: No


Alcohol Screening: None





Physical Exam





- Physical Exam


Appearance: Well-appearing, No pain distress, Well-nourished


Ill-appearing: None


Pain Distress: Moderate


Eyes: DELANEY, EOMI, Conjunctiva clear


ENT: Ears normal, Nose normal, Oropharynx normal (tenderness along Rt upper 

maxillary  region )


Neck: Supple


Respiratory: Airway patent, Breath sounds clear, Breath sounds equal, 

Respirations nonlabored


Cardiovascular: RRR, Pulses normal, No rub, No murmur


GI/: Soft, Nontender, No masses, Bowel sounds normal, No Organomegaly


Musculoskeletal: Normal strength, ROM intact, No edema, No calf tenderness


Skin: Warm, Dry, Normal color


Neurological: Sensation intact, Motor intact, Reflexes intact, Cranial nerves 

intact, Alert, Oriented


Psychiatric: Affect appropriate, Mood appropriate





Critical Care Note





- Critical Care Note


Total Time (mins): 30





Course





- Course


Vital Signs: 





 











  Temp Pulse Resp BP Pulse Ox


 


 04/04/19 09:35  98.2 F  109 H  20  144/89 H  95














Departure





- Departure


Time of Disposition: 10:20


Disposition: HOME SELF-CARE


Discharge Problem: 


 Pain due to dental caries, Dental abscess





Instructions:  Dental Abscess (ED), Toothache (ED)


Condition: Fair


Pt referred to PMD for follow-up: Yes (pcp)


IPMP verified?: Yes (normal)


Additional Instructions: 


Rinse mouth with warm salt water


Attempt to get apt with oral surgeon


Take meds as directed


Take Ibuprofen 200mg 3  every 6 hrs for control of mild to moderate pain and 

norco for severe pain 


take antibiotics


provided patient with information on oral surgeons who accept medicaid. 

Instructed to contact provider for care.


Prescriptions: 


Hydrocodone Bit/Acetaminophen [Norco 5-325] 1 each PO Q6HR #15 tablet


Clindamycin HCl [Cleocin HCl] 300 mg PO QID #28 capsule


Allergies/Adverse Reactions: 


Allergies





No Known Allergies Allergy (Verified 03/11/19 22:47)


 








Home Medications: 


Ambulatory Orders





Clindamycin HCl [Cleocin HCl] 300 mg PO QID #28 capsule 04/04/19 


Hydrocodone Bit/Acetaminophen [Norco 5-325] 1 each PO Q6HR #15 tablet 04/04/19 








Disposition Discussed With: Patient

## 2019-06-23 NOTE — ED.PDOC
General


ED Provider: 


Dr. QUENTIN KULKARNI





Stated Complaint: Weaknes, pale and sweating.  States drank 2 shots of fireball 

and then took a hit off a "blunt" that he thought was cannabis but made him 

feel different. Girlfriend states he had come by her work to  up and she 

suspected  he was using alcohol as he had the odor of alcohol. Approx 30 min 

late saw his car in parking lot and when she checked on him found him in back 

seat, minimally responsive , pale and sweating .Attempted to arouse him with 

minimal success. Drove him to hospital and he was observed her to be profusely 

diaphoretic, pallorous with altered mental status.  Eventually advised after 

smoking blunt he Started feeling strange, became weak and started sweating 

profusely. Unaware of additional consumptionm.


Time Seen by Physician: 18:05


Information Source: Patient, Family


Exam Limitations: No limitations


Primary Care Provider: 


ERA STUBBS





Nursing and Triage Documentation Reviewed and Agree: Yes


Does patient meet sepsis criteria?: No


System Inflammatory Response Syndrome: Not Applicable


Sepsis Protocol: 


For patient's 13 years and over:





Temp is 96.8 and below  and greater


Pulse >90 BPM


Resp >20/minute


Acutely Altered Mental Status





Are patient's symptoms suggestive of a new infection, such as:


   -Pneumonia


   -Skin, Soft Tissue


   -Endocarditis


   -UTI


   -Bone, Joint Infection


   -Implantable Device


   -Acute Abdominal Infection


   -Wound Infection


   -Meningitis


   -Blood Stream Catheter Infection


   -Unknown








Review of Systems





- Review Of Systems


Constitutional: Reports: Weakness


Eyes: Reports: No symptoms


Ears, Nose, Mouth, Throat: Reports: No symptoms


Respiratory: Reports: No symptoms


Cardiac: Reports: No symptoms, Other (diaphoretic and pallor)


GI: Reports: No symptoms


: Reports: No symptoms


Musculoskeletal: Reports: No symptoms


Skin: Reports: No symptoms


Neurological: Reports: Weakness


Endocrine: Reports: Excessive sweating


Hematologic/Lymphatic: Reports: No symptoms


All Other Systems: Reviewed and Negative





Past Medical History





- Past Medical History


Previously Healthy: No (Yes other than chronic dental issues)


Endocrine: Reports: None


Cardiovascular: Reports: None


Respiratory: Reports: None


Hematological: Reports: None


Gastrointestinal: Reports: None


Genitourinary: Reports: None


Neuro/Psych: Reports: None


Musculoskeletal: Reports: Joint Pain (INFECTION RIGHT THUMB)


Cancer: Reports: None


Other Pertinent Past Medical History: BOXERS FRACTURES TO BOTH HANDS





- Surgical History


General Surgical History: Reports: None





- Family History


Family History: Reports: None





- Social History


Smoking Status: Current every day smoker, Heavy tobacco smoker


Hx Substance Use: No


Alcohol Screening: None





Physical Exam





- Physical Exam


Appearance: Ill-appearing


Ill-appearing: Severe


Pain Distress: None


Eyes: DELANEY, EOMI, Conjunctiva clear, Right pupil size (OU Constricted)


Neck: Supple


Respiratory: Airway patent, Breath sounds clear, Breath sounds equal, 

Respirations nonlabored


Cardiovascular: RRR, Pulses normal, No rub, No murmur


GI/: Soft, Nontender, No masses, Bowel sounds normal, No Organomegaly


Musculoskeletal: Normal strength, ROM intact, No edema, No calf tenderness


Skin: Warm, Dry, Normal color


Neurological: Sensation intact, Motor intact, Reflexes intact, Cranial nerves 

intact, Alert, Oriented


Psychiatric: Affect appropriate (flattened)





Interpretation





- Radiology Interpretation


Radiology Interpretation By: Radiologist


Exam Interpreted: CT Scan (Head -normal)





- Cardiac Monitor


Rate: Normal


Rhythm: Other (LVH?)


Ectopy: None





- EKG Interpretation


Time of EKG #1: 18:15


Rate: Normal





Re-Evaluation





- Re-Evaluation


Time of Re-Evaluation: 19:30


Status: Improved


Vital Signs Stable: Yes


Appearance: NAD


Lungs: Clear


Skin: Other (Color normal)


Neuro: Alert and Oriented X3


CV: RRR





Critical Care Note





- Critical Care Note


Total Time (mins): 60





Course





- Course


Hematology/Chemistry: 


 06/23/19 18:24





 06/23/19 18:24


Orders, Labs, Meds: 


Lab Review











  06/23/19 06/23/19 06/23/19





  18:24 18:24 18:24


 


WBC  10.91 H  


 


RBC  4.53 L  


 


Hgb  15.1  


 


Hct  44.1  


 


MCV  97.4 H  


 


MCH  33.3 H  


 


MCHC  34.2  


 


RDW Coeff of Gabriel  13.4  


 


Plt Count  310  


 


Immature Gran % (Auto)  0.3  


 


Neut % (Auto)  53.3  


 


Lymph % (Auto)  35.1  


 


Mono % (Auto)  10.1 H  


 


Eos % (Auto)  0.7  


 


Baso % (Auto)  0.5  


 


Immature Gran # (Auto)  0.0  


 


Neut # (Auto)  5.8  


 


Lymph # (Auto)  3.8 H  


 


Mono # (Auto)  1.1  


 


Eos # (Auto)  0.1  


 


Baso # (Auto)  0.1  


 


Sodium   142.8 


 


Potassium   3.66 


 


Chloride   103.1 


 


Carbon Dioxide   28.9 


 


Anion Gap   14.46 


 


BUN   14.6 


 


Creatinine   1.39 H 


 


Estimated GFR (MDRD)   61.00 


 


BUN/Creatinine Ratio   10.50 


 


Glucose   124.3 H 


 


Lactic Acid    1.51


 


Calcium   8.95 


 


Total Bilirubin   0.64 


 


AST   27.8 


 


ALT   16.4 


 


Alkaline Phosphatase   62.7 


 


Total Creatine Kinase   141.3 


 


CK-MB (CK-2)   0.858 


 


CK-MB (CK-2) %   0.6000 


 


Troponin I   < 0.012 


 


Total Protein   7.34 


 


Albumin   4.80 


 


Globulin   2.54 


 


Albumin/Globulin Ratio   1.88 


 


Urine Color   


 


Urine Clarity   


 


Urine pH   


 


Ur Specific Gravity   


 


Urine Protein   


 


Urine Glucose (UA)   


 


Urine Ketones   


 


Urine Blood   


 


Urine Nitrite   


 


Urine Bilirubin   


 


Urine Urobilinogen   


 


Ur Leukocyte Esterase   


 


Urine Microscopic RBC   


 


Urine Microscopic WBC   


 


Ur Squamous Epith Cells   


 


Hyaline Casts   


 


Urine Mucus   


 


Urine Opiates Screen   


 


Ur Oxycodone Screen   


 


Urine Methadone Screen   


 


Ur Propoxyphene Screen   


 


Ur Barbiturates Screen   


 


U Tricyclic Antidepress   


 


Ur Phencyclidine Scrn   


 


Ur Amphetamine Screen   


 


U Methamphetamines Scrn   


 


U Benzodiazepines Scrn   


 


Urine Cocaine Screen   


 


U Cannabinoids Screen   


 


Plasma/Serum Alcohol   














  06/23/19 06/23/19 06/23/19





  18:24 19:35 19:35


 


WBC   


 


RBC   


 


Hgb   


 


Hct   


 


MCV   


 


MCH   


 


MCHC   


 


RDW Coeff of Gabriel   


 


Plt Count   


 


Immature Gran % (Auto)   


 


Neut % (Auto)   


 


Lymph % (Auto)   


 


Mono % (Auto)   


 


Eos % (Auto)   


 


Baso % (Auto)   


 


Immature Gran # (Auto)   


 


Neut # (Auto)   


 


Lymph # (Auto)   


 


Mono # (Auto)   


 


Eos # (Auto)   


 


Baso # (Auto)   


 


Sodium   


 


Potassium   


 


Chloride   


 


Carbon Dioxide   


 


Anion Gap   


 


BUN   


 


Creatinine   


 


Estimated GFR (MDRD)   


 


BUN/Creatinine Ratio   


 


Glucose   


 


Lactic Acid   


 


Calcium   


 


Total Bilirubin   


 


AST   


 


ALT   


 


Alkaline Phosphatase   


 


Total Creatine Kinase   


 


CK-MB (CK-2)   


 


CK-MB (CK-2) %   


 


Troponin I   


 


Total Protein   


 


Albumin   


 


Globulin   


 


Albumin/Globulin Ratio   


 


Urine Color    Yellow


 


Urine Clarity    Clear


 


Urine pH    6.5


 


Ur Specific Gravity    1.025


 


Urine Protein    1+


 


Urine Glucose (UA)    Negative


 


Urine Ketones    Negative


 


Urine Blood    Negative


 


Urine Nitrite    Negative


 


Urine Bilirubin    Negative


 


Urine Urobilinogen    0.2


 


Ur Leukocyte Esterase    Negative


 


Urine Microscopic RBC    0-2


 


Urine Microscopic WBC    0-2


 


Ur Squamous Epith Cells    Not present


 


Hyaline Casts    0-2


 


Urine Mucus    2+


 


Urine Opiates Screen   Negative 


 


Ur Oxycodone Screen   Negative 


 


Urine Methadone Screen   Negative 


 


Ur Propoxyphene Screen   Negative 


 


Ur Barbiturates Screen   Negative 


 


U Tricyclic Antidepress   Negative 


 


Ur Phencyclidine Scrn   Negative 


 


Ur Amphetamine Screen   Negative 


 


U Methamphetamines Scrn   Negative 


 


U Benzodiazepines Scrn   Negative 


 


Urine Cocaine Screen   Negative 


 


U Cannabinoids Screen   Positive 


 


Plasma/Serum Alcohol  36.0  








Orders











 Category Date Time Status


 


 EKG-(ED ONLY) Stat CARDIO  06/23/19 18:14 Completed


 


 IV [ED IV/MEDIPORT/POWERPORT] .ONCE EMERGENCY  06/23/19 18:15 Active


 


 ALCOHOL LEVEL [BLOOD ALCOHOL] Stat LAB  06/23/19 18:24 Completed


 


 BLOOD CULTURE (ED ONLY) Stat LAB  06/23/19 18:24 Received


 


 CBC W/ AUTO DIFF Stat LAB  06/23/19 18:24 Completed


 


 CMP [COMPREHENSIVE METABOLIC PANEL] Stat LAB  06/23/19 18:24 Completed


 


 CPK [CREATINE KINASE] Stat LAB  06/23/19 18:24 Completed


 


 LACTIC ACID Stat LAB  06/23/19 18:24 Completed


 


 TROPONIN I Stat LAB  06/23/19 18:24 Completed


 


 UA [URINALYSIS C & S IF INDICATED] Stat LAB  06/23/19 19:35 Completed


 


 URINE DRUG SCREEN (RAPID FOR ED) [DRUG SCREEN, URINE, LAB  06/23/19 19:35 

Completed





 RAPID] Stat   


 


 0.9 % Sodium Chloride [Saline Flush] MEDS  06/23/19 18:15 Ordered





 1 syr IVF PRN PRN   


 


 Sodium Chloride 0.9% [Sodium Chloride] 1,000 ml MEDS  06/23/19 18:15 

Discontinued





 IV BOLUS   


 


 CHEST, 1V AP ONLY Stat RADS  06/23/19 18:14 Taken


 


 CT HEAD W/O CONTRAST Stat RADS  06/23/19 19:39 Completed








Medications











Generic Name Dose Route Start Last Admin





  Trade Name Freq  PRN Reason Stop Dose Admin


 


Sodium Chloride  1 syr  06/23/19 18:15  





  Saline Flush  IVF   





  PRN PRN   





  To flush IV   





     





     





     














Discontinued Medications














Generic Name Dose Route Start Last Admin





  Trade Name Freq  PRN Reason Stop Dose Admin


 


Sodium Chloride  1,000 mls @ 1,000 mls/hr  06/23/19 18:15  06/23/19 18:23





  Sodium Chloride  IV  06/23/19 19:14  1,000 mls/hr





  BOLUS STA   Administration





     





     





     





     











Vital Signs: 


 











  Temp Pulse Resp BP Pulse Ox


 


 06/23/19 18:07  97.8 F  83  16  124/67  95














MILAN Risk Score


MILAN Risk Score: 


Risk Score      Odds of death by 30D


      0                 0.1 (0.1-0.2)


      1                 0.3 (0.2-0.3)


      2                 0.4 (0.3-0.5)


      3                 0.7 (0.6-0.9)


      4                 1.2 (1.0-1.5)


      5                 2.2 (1.9-2.6)


      6                 3.0 (2.5-3.6)


      7                 4.8 (3.8-6.1)








Departure





- Departure


Time of Disposition: 20:30


Disposition: PLACED AS OBSERVATION


Discharge Problem: 


 Altered mental status, unspecified, Diaphoresis, Adverse reaction to drug, 

Alcohol abuse





Instructions:  Abuse of Alcohol (DC), Cannabis Abuse (ED), Adverse Drug 

Reaction (ED)


Condition: Good


Pt referred to PMD for follow-up: Yes


IPMP verified?: No


Additional Instructions: 


Avoid use of elicit drugs and alcohol


See PCP in follow up later this week


Allergies/Adverse Reactions: 


Allergies





No Known Allergies Allergy (Verified 06/23/19 18:25)


 








Home Medications: 


Ambulatory Orders





1 [No Reported Medications]  06/23/19 








Disposition Discussed With: Patient, Other (friend)





Neurological Complaint Exam





- Altered Mental Status Complaint/Exam


Current Mental Status: Confusion


Last Known Well: 2 hrs ago


Onset: Gradual


Duration: 1-2 hra


Symptoms Are: Still present


Timing: Constant


Initial Severity: Moderate


Current Severity: Moderate


Eye Deviation Present: No


Character: Reports: Responsiveness, Lethargy


Aggravating: Reports: Ingestion


Associated Signs and Symptoms: Reports: Dizziness, Weakness


Related History: Reports: Similar episode


Cardiac Risk Factors: Reports: None


CVA Risk Factors: Reports: None


Carotid Bruit Present: No


Nystagmus Present: No


Gag Reflex Present: Yes


Meningeal Signs Positive: No


Focal Weakness: Present: None


Focal Sensory Loss: Present: None


Gait: Unable


Babinski Sign: Negative Right, Negative Left


Signs of Injury: Present: Normal findings


Thrombolytics Considered: No


Differential Diagnoses: Intoxication, Metabolic Disorder, Hypoglycemia, Overdose

, Medication reaction, Seizure

## 2019-06-23 NOTE — CT
EXAM:  CT of the head without contrast 

  

History:  Dizziness and seizures. 

  

Technique:  Multiplanar CT images through the head were obtained without the administration of IV con
trast 

  

Comparison:  Head CT 05/17/2018 

  

Findings:  The visualized paranasal sinuses and mastoid air cells are clear in general.  No acute david
varial abnormalities. 

  

Intracranially the ventricular and cisternal spaces are normal in size, shape and configuration for a
 patient of this age.  No dominant mass or midline shift.  No hydrocephalous.  No acute intracranial 
hemorrhage or abnormal extraaxial fluid collections. 

  

Impression:  No acute intracranial process

## 2019-06-24 NOTE — DI
EXAM:  CHEST FRONTAL VIEW 

  

HISTORY:  Shortness of breath. 

COMPARISON:  09/17/2018 

  

FINDINGS:    Heart size and mediastinum remain within normal limits.    Lungs are free of infiltrate.
  No consolidation or pleural fluid.  There is no pneumothorax or acute bony finding. 

  

IMPRESSION:  No acute cardiopulmonary process.

## 2022-09-02 ENCOUNTER — HOSPITAL ENCOUNTER (EMERGENCY)
Age: 32
Discharge: ANOTHER HOSPITAL | End: 2022-09-03
Attending: EMERGENCY MEDICINE
Payer: MEDICAID

## 2022-09-02 ENCOUNTER — APPOINTMENT (OUTPATIENT)
Dept: CT IMAGING | Age: 32
End: 2022-09-02
Payer: MEDICAID

## 2022-09-02 ENCOUNTER — APPOINTMENT (OUTPATIENT)
Dept: GENERAL RADIOLOGY | Age: 32
End: 2022-09-02
Payer: MEDICAID
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
## 2022-09-02 DIAGNOSIS — S09.90XA CLOSED HEAD INJURY, INITIAL ENCOUNTER: ICD-10-CM

## 2022-09-02 DIAGNOSIS — S36.039A LACERATION OF SPLEEN, INITIAL ENCOUNTER: ICD-10-CM

## 2022-09-02 DIAGNOSIS — S32.009A CLOSED FRACTURE DISLOCATION OF LUMBAR SPINE, INITIAL ENCOUNTER (HCC): ICD-10-CM

## 2022-09-02 DIAGNOSIS — S27.0XXA TRAUMATIC FRACTURE OF RIBS OF LEFT SIDE WITH PNEUMOTHORAX: Primary | ICD-10-CM

## 2022-09-02 DIAGNOSIS — S22.42XA TRAUMATIC FRACTURE OF RIBS OF LEFT SIDE WITH PNEUMOTHORAX: Primary | ICD-10-CM

## 2022-09-02 DIAGNOSIS — S32.301A CLOSED FRACTURE OF RIGHT ILIAC WING, INITIAL ENCOUNTER (HCC): ICD-10-CM

## 2022-09-02 LAB
ABO/RH: NORMAL
ALBUMIN SERPL-MCNC: 4.4 G/DL (ref 3.5–5.2)
ALP BLD-CCNC: 66 U/L (ref 40–130)
ALT SERPL-CCNC: 163 U/L (ref 5–41)
AMPHETAMINE SCREEN, URINE: NEGATIVE
ANION GAP SERPL CALCULATED.3IONS-SCNC: 18 MMOL/L (ref 7–19)
ANTIBODY SCREEN: NORMAL
AST SERPL-CCNC: 184 U/L (ref 5–40)
BARBITURATE SCREEN URINE: NEGATIVE
BASE EXCESS ARTERIAL: 1.1 MMOL/L (ref -2–2)
BASOPHILS ABSOLUTE: 0 K/UL (ref 0–0.2)
BASOPHILS RELATIVE PERCENT: 0.4 % (ref 0–1)
BENZODIAZEPINE SCREEN, URINE: NEGATIVE
BILIRUB SERPL-MCNC: 0.3 MG/DL (ref 0.2–1.2)
BUN BLDV-MCNC: 13 MG/DL (ref 6–20)
BUPRENORPHINE URINE: NEGATIVE
CALCIUM SERPL-MCNC: 9 MG/DL (ref 8.6–10)
CANNABINOID SCREEN URINE: POSITIVE
CARBOXYHEMOGLOBIN ARTERIAL: 3.8 % (ref 0–5)
CHLORIDE BLD-SCNC: 105 MMOL/L (ref 98–111)
CO2: 22 MMOL/L (ref 22–29)
COCAINE METABOLITE SCREEN URINE: NEGATIVE
CREAT SERPL-MCNC: 1.2 MG/DL (ref 0.5–1.2)
EOSINOPHILS ABSOLUTE: 0.1 K/UL (ref 0–0.6)
EOSINOPHILS RELATIVE PERCENT: 1.1 % (ref 0–5)
ETHANOL: <10 MG/DL (ref 0–0.08)
GFR AFRICAN AMERICAN: >59
GFR NON-AFRICAN AMERICAN: >60
GLUCOSE BLD-MCNC: 180 MG/DL (ref 74–109)
HCO3 ARTERIAL: 28.3 MMOL/L (ref 22–26)
HCT VFR BLD CALC: 41.5 % (ref 42–52)
HEMOGLOBIN, ART, EXTENDED: 13.2 G/DL (ref 14–18)
HEMOGLOBIN: 13.9 G/DL (ref 14–18)
IMMATURE GRANULOCYTES #: 0.1 K/UL
LACTIC ACID: 2.1 MMOL/L (ref 0.5–1.9)
LYMPHOCYTES ABSOLUTE: 2.4 K/UL (ref 1.1–4.5)
LYMPHOCYTES RELATIVE PERCENT: 23.6 % (ref 20–40)
Lab: ABNORMAL
MCH RBC QN AUTO: 31.7 PG (ref 27–31)
MCHC RBC AUTO-ENTMCNC: 33.5 G/DL (ref 33–37)
MCV RBC AUTO: 94.5 FL (ref 80–94)
METHADONE SCREEN, URINE: NEGATIVE
METHAMPHETAMINE, URINE: NEGATIVE
METHEMOGLOBIN ARTERIAL: 1.2 %
MONOCYTES ABSOLUTE: 0.7 K/UL (ref 0–0.9)
MONOCYTES RELATIVE PERCENT: 6.4 % (ref 0–10)
NEUTROPHILS ABSOLUTE: 6.8 K/UL (ref 1.5–7.5)
NEUTROPHILS RELATIVE PERCENT: 67.6 % (ref 50–65)
O2 CONTENT ARTERIAL: 16.3 ML/DL
O2 DELIVERY DEVICE: ABNORMAL
O2 SAT, ARTERIAL: 87.7 %
O2 THERAPY: ABNORMAL
OPIATE SCREEN URINE: NEGATIVE
OXYCODONE URINE: NEGATIVE
OXYGEN FLOW: 3
PCO2 ARTERIAL: 55 MMHG (ref 35–45)
PDW BLD-RTO: 13.9 % (ref 11.5–14.5)
PH ARTERIAL: 7.32 (ref 7.35–7.45)
PHENCYCLIDINE SCREEN URINE: NEGATIVE
PLATELET # BLD: 288 K/UL (ref 130–400)
PMV BLD AUTO: 10.6 FL (ref 9.4–12.4)
PO2 ARTERIAL: 58 MMHG (ref 80–100)
POTASSIUM SERPL-SCNC: 4.1 MMOL/L (ref 3.5–5)
POTASSIUM, WHOLE BLOOD: 4.3
PROPOXYPHENE SCREEN: NEGATIVE
RBC # BLD: 4.39 M/UL (ref 4.7–6.1)
SAMPLE SOURCE: ABNORMAL
SARS-COV-2, NAAT: NOT DETECTED
SODIUM BLD-SCNC: 145 MMOL/L (ref 136–145)
TOTAL PROTEIN: 6.6 G/DL (ref 6.6–8.7)
TRICYCLIC, URINE: NEGATIVE
WBC # BLD: 10.1 K/UL (ref 4.8–10.8)

## 2022-09-02 PROCEDURE — 72125 CT NECK SPINE W/O DYE: CPT

## 2022-09-02 PROCEDURE — 36415 COLL VENOUS BLD VENIPUNCTURE: CPT

## 2022-09-02 PROCEDURE — 96376 TX/PRO/DX INJ SAME DRUG ADON: CPT

## 2022-09-02 PROCEDURE — 82803 BLOOD GASES ANY COMBINATION: CPT

## 2022-09-02 PROCEDURE — 86900 BLOOD TYPING SEROLOGIC ABO: CPT

## 2022-09-02 PROCEDURE — 86850 RBC ANTIBODY SCREEN: CPT

## 2022-09-02 PROCEDURE — 74177 CT ABD & PELVIS W/CONTRAST: CPT

## 2022-09-02 PROCEDURE — 87635 SARS-COV-2 COVID-19 AMP PRB: CPT

## 2022-09-02 PROCEDURE — 80306 DRUG TEST PRSMV INSTRMNT: CPT

## 2022-09-02 PROCEDURE — 72131 CT LUMBAR SPINE W/O DYE: CPT

## 2022-09-02 PROCEDURE — 96361 HYDRATE IV INFUSION ADD-ON: CPT

## 2022-09-02 PROCEDURE — 96374 THER/PROPH/DIAG INJ IV PUSH: CPT

## 2022-09-02 PROCEDURE — 36600 WITHDRAWAL OF ARTERIAL BLOOD: CPT

## 2022-09-02 PROCEDURE — 71045 X-RAY EXAM CHEST 1 VIEW: CPT

## 2022-09-02 PROCEDURE — 71260 CT THORAX DX C+: CPT

## 2022-09-02 PROCEDURE — 90471 IMMUNIZATION ADMIN: CPT | Performed by: EMERGENCY MEDICINE

## 2022-09-02 PROCEDURE — 72128 CT CHEST SPINE W/O DYE: CPT

## 2022-09-02 PROCEDURE — 99285 EMERGENCY DEPT VISIT HI MDM: CPT

## 2022-09-02 PROCEDURE — 70450 CT HEAD/BRAIN W/O DYE: CPT

## 2022-09-02 PROCEDURE — 32551 INSERTION OF CHEST TUBE: CPT

## 2022-09-02 PROCEDURE — 93005 ELECTROCARDIOGRAM TRACING: CPT

## 2022-09-02 PROCEDURE — 80053 COMPREHEN METABOLIC PANEL: CPT

## 2022-09-02 PROCEDURE — 90715 TDAP VACCINE 7 YRS/> IM: CPT | Performed by: EMERGENCY MEDICINE

## 2022-09-02 PROCEDURE — 2580000003 HC RX 258: Performed by: EMERGENCY MEDICINE

## 2022-09-02 PROCEDURE — 86901 BLOOD TYPING SEROLOGIC RH(D): CPT

## 2022-09-02 PROCEDURE — 6360000002 HC RX W HCPCS: Performed by: EMERGENCY MEDICINE

## 2022-09-02 PROCEDURE — 2500000003 HC RX 250 WO HCPCS: Performed by: EMERGENCY MEDICINE

## 2022-09-02 PROCEDURE — 82077 ASSAY SPEC XCP UR&BREATH IA: CPT

## 2022-09-02 PROCEDURE — 70486 CT MAXILLOFACIAL W/O DYE: CPT

## 2022-09-02 PROCEDURE — 6360000002 HC RX W HCPCS

## 2022-09-02 PROCEDURE — 6360000004 HC RX CONTRAST MEDICATION: Performed by: EMERGENCY MEDICINE

## 2022-09-02 PROCEDURE — 85025 COMPLETE CBC W/AUTO DIFF WBC: CPT

## 2022-09-02 PROCEDURE — 83605 ASSAY OF LACTIC ACID: CPT

## 2022-09-02 RX ORDER — ETOMIDATE 2 MG/ML
10 INJECTION INTRAVENOUS ONCE
Status: COMPLETED | OUTPATIENT
Start: 2022-09-02 | End: 2022-09-02

## 2022-09-02 RX ORDER — HYDROMORPHONE HYDROCHLORIDE 1 MG/ML
1 INJECTION, SOLUTION INTRAMUSCULAR; INTRAVENOUS; SUBCUTANEOUS ONCE
Status: COMPLETED | OUTPATIENT
Start: 2022-09-02 | End: 2022-09-02

## 2022-09-02 RX ORDER — SODIUM CHLORIDE, SODIUM LACTATE, POTASSIUM CHLORIDE, AND CALCIUM CHLORIDE .6; .31; .03; .02 G/100ML; G/100ML; G/100ML; G/100ML
1000 INJECTION, SOLUTION INTRAVENOUS ONCE
Status: COMPLETED | OUTPATIENT
Start: 2022-09-02 | End: 2022-09-02

## 2022-09-02 RX ADMIN — HYDROMORPHONE HYDROCHLORIDE 1 MG: 1 INJECTION, SOLUTION INTRAMUSCULAR; INTRAVENOUS; SUBCUTANEOUS at 20:06

## 2022-09-02 RX ADMIN — SODIUM CHLORIDE, POTASSIUM CHLORIDE, SODIUM LACTATE AND CALCIUM CHLORIDE 1000 ML: 600; 310; 30; 20 INJECTION, SOLUTION INTRAVENOUS at 20:12

## 2022-09-02 RX ADMIN — HYDROMORPHONE HYDROCHLORIDE 1 MG: 1 INJECTION, SOLUTION INTRAMUSCULAR; INTRAVENOUS; SUBCUTANEOUS at 22:27

## 2022-09-02 RX ADMIN — TETANUS TOXOID, REDUCED DIPHTHERIA TOXOID AND ACELLULAR PERTUSSIS VACCINE, ADSORBED 0.5 ML: 5; 2.5; 8; 8; 2.5 SUSPENSION INTRAMUSCULAR at 21:09

## 2022-09-02 RX ADMIN — HYDROMORPHONE HYDROCHLORIDE 1 MG: 1 INJECTION, SOLUTION INTRAMUSCULAR; INTRAVENOUS; SUBCUTANEOUS at 23:42

## 2022-09-02 RX ADMIN — IOPAMIDOL 90 ML: 755 INJECTION, SOLUTION INTRAVENOUS at 20:22

## 2022-09-02 RX ADMIN — ETOMIDATE 10 MG: 20 INJECTION, SOLUTION INTRAVENOUS at 20:13

## 2022-09-02 ASSESSMENT — ENCOUNTER SYMPTOMS
BACK PAIN: 0
ABDOMINAL PAIN: 1
SHORTNESS OF BREATH: 1
VOMITING: 0
ABDOMINAL DISTENTION: 0

## 2022-09-02 ASSESSMENT — PAIN SCALES - GENERAL
PAINLEVEL_OUTOF10: 8
PAINLEVEL_OUTOF10: 9

## 2022-09-03 VITALS
HEART RATE: 86 BPM | WEIGHT: 170 LBS | RESPIRATION RATE: 16 BRPM | SYSTOLIC BLOOD PRESSURE: 120 MMHG | OXYGEN SATURATION: 95 % | TEMPERATURE: 97.2 F | DIASTOLIC BLOOD PRESSURE: 68 MMHG | HEIGHT: 69 IN | BODY MASS INDEX: 25.18 KG/M2

## 2022-09-03 PROCEDURE — 6360000002 HC RX W HCPCS

## 2022-09-03 PROCEDURE — 96376 TX/PRO/DX INJ SAME DRUG ADON: CPT

## 2022-09-03 RX ORDER — HYDROMORPHONE HYDROCHLORIDE 1 MG/ML
1 INJECTION, SOLUTION INTRAMUSCULAR; INTRAVENOUS; SUBCUTANEOUS ONCE
Status: COMPLETED | OUTPATIENT
Start: 2022-09-03 | End: 2022-09-03

## 2022-09-03 RX ADMIN — HYDROMORPHONE HYDROCHLORIDE 1 MG: 1 INJECTION, SOLUTION INTRAMUSCULAR; INTRAVENOUS; SUBCUTANEOUS at 01:09

## 2022-09-03 NOTE — ED NOTES
Report called to Christine Lesches, RN at [address] [place] [id number].       Luna Gomez RN  09/02/22 1902

## 2022-09-03 NOTE — ED NOTES
Pt in [address] [place] [address] upon arrival to ED per EMS     Abbey Quintanilla, RN  09/02/22 2008
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
## 2022-09-03 NOTE — ED NOTES
Called [employer] for weather check and flight to [address] [place] [address]. They are going to check weather and call me back.       Zabrina Victoria RN  09/02/22 6654

## 2022-09-03 NOTE — ED NOTES
Explained to pt that he cannot have anything by mouth at this time. Pt and family state understanding at this time.       Murray Franco RN  09/03/22 0025

## 2022-09-03 NOTE — ED NOTES
[employer] dispatch on the phone. They have had a patch of weather coming over [place]. They stated to call dispatch when pt is ready for another weather check.       Jesús Lloyd RN  09/02/22 4693 Speaking Coherently

## 2022-09-03 NOTE — ED NOTES
Chest tube in place per Dr. Matt Kennedy. O2 97% at this time.      Delores Gutierrez, SEAN  09/02/22 2024

## 2022-09-03 NOTE — ED NOTES
Additional 10 mg of Etomidate given by Agustin Tijerina RN per verbal order from Dr. Jonatan Aviles.      Julia Rios RN  09/02/22 2022

## 2022-09-06 LAB
EKG P AXIS: 70 DEGREES
EKG P-R INTERVAL: 132 MS
EKG Q-T INTERVAL: 386 MS
EKG QRS DURATION: 86 MS
EKG QTC CALCULATION (BAZETT): 412 MS
EKG T AXIS: 71 DEGREES

## 2022-09-09 ENCOUNTER — APPOINTMENT (OUTPATIENT)
Dept: GENERAL RADIOLOGY | Age: 32
End: 2022-09-09
Payer: MEDICAID

## 2022-09-09 ENCOUNTER — APPOINTMENT (OUTPATIENT)
Dept: CT IMAGING | Age: 32
End: 2022-09-09
Payer: MEDICAID

## 2022-09-09 ENCOUNTER — HOSPITAL ENCOUNTER (EMERGENCY)
Age: 32
Discharge: HOME | End: 2022-09-09
Attending: EMERGENCY MEDICINE
Payer: MEDICAID

## 2022-09-09 VITALS
DIASTOLIC BLOOD PRESSURE: 77 MMHG | OXYGEN SATURATION: 96 % | RESPIRATION RATE: 16 BRPM | SYSTOLIC BLOOD PRESSURE: 125 MMHG | TEMPERATURE: 98.2 F | HEART RATE: 89 BPM

## 2022-09-09 DIAGNOSIS — Z87.09 HISTORY OF PNEUMOTHORAX: ICD-10-CM

## 2022-09-09 DIAGNOSIS — T14.8XXA INFECTION, WOUND STATUS POST TRAUMA: Primary | ICD-10-CM

## 2022-09-09 DIAGNOSIS — L08.9 INFECTION, WOUND STATUS POST TRAUMA: Primary | ICD-10-CM

## 2022-09-09 DIAGNOSIS — S22.42XD CLOSED FRACTURE OF MULTIPLE RIBS OF LEFT SIDE WITH ROUTINE HEALING, SUBSEQUENT ENCOUNTER: ICD-10-CM

## 2022-09-09 DIAGNOSIS — S32.009D CLOSED FRACTURE OF TRANSVERSE PROCESS OF LUMBAR VERTEBRA WITH ROUTINE HEALING, SUBSEQUENT ENCOUNTER: ICD-10-CM

## 2022-09-09 LAB
ALBUMIN SERPL-MCNC: 4.1 G/DL (ref 3.5–5.2)
ALP BLD-CCNC: 72 U/L (ref 40–130)
ALT SERPL-CCNC: 54 U/L (ref 5–41)
ANION GAP SERPL CALCULATED.3IONS-SCNC: 11 MMOL/L (ref 7–19)
AST SERPL-CCNC: 31 U/L (ref 5–40)
BASOPHILS ABSOLUTE: 0.1 K/UL (ref 0–0.2)
BASOPHILS RELATIVE PERCENT: 0.6 % (ref 0–1)
BILIRUB SERPL-MCNC: 0.5 MG/DL (ref 0.2–1.2)
BILIRUBIN URINE: NEGATIVE
BLOOD, URINE: NEGATIVE
BUN BLDV-MCNC: 18 MG/DL (ref 6–20)
CALCIUM SERPL-MCNC: 9 MG/DL (ref 8.6–10)
CHLORIDE BLD-SCNC: 98 MMOL/L (ref 98–111)
CLARITY: CLEAR
CO2: 27 MMOL/L (ref 22–29)
COLOR: YELLOW
CREAT SERPL-MCNC: 0.9 MG/DL (ref 0.5–1.2)
EOSINOPHILS ABSOLUTE: 0.2 K/UL (ref 0–0.6)
EOSINOPHILS RELATIVE PERCENT: 1.7 % (ref 0–5)
GFR AFRICAN AMERICAN: >59
GFR NON-AFRICAN AMERICAN: >60
GLUCOSE BLD-MCNC: 108 MG/DL (ref 74–109)
GLUCOSE URINE: NEGATIVE MG/DL
HCT VFR BLD CALC: 37.1 % (ref 42–52)
HEMOGLOBIN: 12.3 G/DL (ref 14–18)
IMMATURE GRANULOCYTES #: 0 K/UL
KETONES, URINE: NEGATIVE MG/DL
LACTIC ACID, SEPSIS: 1.5 MG/DL (ref 0.5–1.9)
LEUKOCYTE ESTERASE, URINE: NEGATIVE
LYMPHOCYTES ABSOLUTE: 1.7 K/UL (ref 1.1–4.5)
LYMPHOCYTES RELATIVE PERCENT: 18 % (ref 20–40)
MCH RBC QN AUTO: 31.5 PG (ref 27–31)
MCHC RBC AUTO-ENTMCNC: 33.2 G/DL (ref 33–37)
MCV RBC AUTO: 95.1 FL (ref 80–94)
MONOCYTES ABSOLUTE: 0.9 K/UL (ref 0–0.9)
MONOCYTES RELATIVE PERCENT: 9.2 % (ref 0–10)
NEUTROPHILS ABSOLUTE: 6.8 K/UL (ref 1.5–7.5)
NEUTROPHILS RELATIVE PERCENT: 70.2 % (ref 50–65)
NITRITE, URINE: NEGATIVE
PDW BLD-RTO: 13.8 % (ref 11.5–14.5)
PH UA: 6.5 (ref 5–8)
PLATELET # BLD: 440 K/UL (ref 130–400)
PMV BLD AUTO: 10.5 FL (ref 9.4–12.4)
POTASSIUM SERPL-SCNC: 4.3 MMOL/L (ref 3.5–5)
PROTEIN UA: NEGATIVE MG/DL
RBC # BLD: 3.9 M/UL (ref 4.7–6.1)
SARS-COV-2, NAAT: NOT DETECTED
SODIUM BLD-SCNC: 136 MMOL/L (ref 136–145)
SPECIFIC GRAVITY UA: 1.01 (ref 1–1.03)
TOTAL PROTEIN: 6.8 G/DL (ref 6.6–8.7)
UROBILINOGEN, URINE: 1 E.U./DL
WBC # BLD: 9.7 K/UL (ref 4.8–10.8)

## 2022-09-09 PROCEDURE — 83605 ASSAY OF LACTIC ACID: CPT

## 2022-09-09 PROCEDURE — 81003 URINALYSIS AUTO W/O SCOPE: CPT

## 2022-09-09 PROCEDURE — 87070 CULTURE OTHR SPECIMN AEROBIC: CPT

## 2022-09-09 PROCEDURE — 71260 CT THORAX DX C+: CPT

## 2022-09-09 PROCEDURE — 71260 CT THORAX DX C+: CPT | Performed by: RADIOLOGY

## 2022-09-09 PROCEDURE — 71045 X-RAY EXAM CHEST 1 VIEW: CPT

## 2022-09-09 PROCEDURE — 74177 CT ABD & PELVIS W/CONTRAST: CPT

## 2022-09-09 PROCEDURE — 36415 COLL VENOUS BLD VENIPUNCTURE: CPT

## 2022-09-09 PROCEDURE — 99285 EMERGENCY DEPT VISIT HI MDM: CPT

## 2022-09-09 PROCEDURE — 6360000004 HC RX CONTRAST MEDICATION: Performed by: EMERGENCY MEDICINE

## 2022-09-09 PROCEDURE — 71045 X-RAY EXAM CHEST 1 VIEW: CPT | Performed by: RADIOLOGY

## 2022-09-09 PROCEDURE — 74177 CT ABD & PELVIS W/CONTRAST: CPT | Performed by: RADIOLOGY

## 2022-09-09 PROCEDURE — 93005 ELECTROCARDIOGRAM TRACING: CPT | Performed by: PEDIATRICS

## 2022-09-09 PROCEDURE — 87205 SMEAR GRAM STAIN: CPT

## 2022-09-09 PROCEDURE — 87075 CULTR BACTERIA EXCEPT BLOOD: CPT

## 2022-09-09 PROCEDURE — 80053 COMPREHEN METABOLIC PANEL: CPT

## 2022-09-09 PROCEDURE — 85025 COMPLETE CBC W/AUTO DIFF WBC: CPT

## 2022-09-09 PROCEDURE — 87186 SC STD MICRODIL/AGAR DIL: CPT

## 2022-09-09 PROCEDURE — 87635 SARS-COV-2 COVID-19 AMP PRB: CPT

## 2022-09-09 PROCEDURE — 6370000000 HC RX 637 (ALT 250 FOR IP): Performed by: EMERGENCY MEDICINE

## 2022-09-09 RX ORDER — OXYCODONE HYDROCHLORIDE AND ACETAMINOPHEN 5; 325 MG/1; MG/1
1 TABLET ORAL ONCE
Status: COMPLETED | OUTPATIENT
Start: 2022-09-09 | End: 2022-09-09

## 2022-09-09 RX ORDER — LIDOCAINE 50 MG/G
1 PATCH TOPICAL DAILY
COMMUNITY

## 2022-09-09 RX ORDER — SULFAMETHOXAZOLE AND TRIMETHOPRIM 800; 160 MG/1; MG/1
1 TABLET ORAL 2 TIMES DAILY
Qty: 20 TABLET | Refills: 0 | Status: SHIPPED | OUTPATIENT
Start: 2022-09-09 | End: 2022-09-19

## 2022-09-09 RX ORDER — SULFAMETHOXAZOLE AND TRIMETHOPRIM 800; 160 MG/1; MG/1
1 TABLET ORAL ONCE
Status: COMPLETED | OUTPATIENT
Start: 2022-09-09 | End: 2022-09-09

## 2022-09-09 RX ORDER — METHOCARBAMOL 500 MG/1
500 TABLET, FILM COATED ORAL 3 TIMES DAILY
COMMUNITY

## 2022-09-09 RX ORDER — OXYCODONE HYDROCHLORIDE 5 MG/1
10 TABLET ORAL EVERY 4 HOURS PRN
COMMUNITY

## 2022-09-09 RX ORDER — ACETAMINOPHEN 500 MG
500 TABLET ORAL EVERY 8 HOURS PRN
COMMUNITY

## 2022-09-09 RX ADMIN — OXYCODONE HYDROCHLORIDE AND ACETAMINOPHEN 1 TABLET: 5; 325 TABLET ORAL at 21:27

## 2022-09-09 RX ADMIN — SULFAMETHOXAZOLE AND TRIMETHOPRIM 1 TABLET: 800; 160 TABLET ORAL at 22:50

## 2022-09-09 ASSESSMENT — ENCOUNTER SYMPTOMS
SHORTNESS OF BREATH: 0
COUGH: 0
VOMITING: 0
VOICE CHANGE: 0
EYE REDNESS: 0
RHINORRHEA: 0
ABDOMINAL PAIN: 0
EYE PAIN: 0
DIARRHEA: 0

## 2022-09-09 ASSESSMENT — PAIN SCALES - GENERAL: PAINLEVEL_OUTOF10: 7

## 2022-09-10 NOTE — ED NOTES
PT ambulated into hallway and asked to go outside and smoke. PT advised this is a non-smoking facility and we can get him a Nicotine patch if needed.  PT declined and went to CT via w/c at this time     Malaika Crane RN  09/09/22 1571

## 2022-09-10 NOTE — ED NOTES
Back wounds cleansed with hibiclens and packed, abd pad applied     Estefania Hawk RN  09/09/22 8644

## 2022-09-10 NOTE — ED NOTES
Pt walked out of ER. Visitor in room still. Dr Baldemar Ro notified.       Ange Hayden, RN  09/09/22 9803
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
## 2022-09-10 NOTE — ED NOTES
Pt stated he was going to go outside and smoke. Informed pt it is a no smoking campus and he had an IV in. Pt went back into room.       Timbo Hurley RN  09/09/22 4924

## 2022-09-12 LAB
EKG P AXIS: 54 DEGREES
EKG P-R INTERVAL: 120 MS
EKG Q-T INTERVAL: 378 MS
EKG QRS DURATION: 91 MS
EKG QTC CALCULATION (BAZETT): 420 MS
EKG T AXIS: 45 DEGREES

## 2022-09-12 PROCEDURE — 93010 ELECTROCARDIOGRAM REPORT: CPT | Performed by: INTERNAL MEDICINE

## 2022-09-13 LAB
ANAEROBIC CULTURE: ABNORMAL
GRAM STAIN RESULT: ABNORMAL
ORGANISM: ABNORMAL
ORGANISM: ABNORMAL
WOUND/ABSCESS: ABNORMAL
WOUND/ABSCESS: ABNORMAL

## 2024-01-13 ENCOUNTER — HOSPITAL ENCOUNTER (EMERGENCY)
Facility: HOSPITAL | Age: 34
Discharge: HOME | End: 2024-01-13
Payer: COMMERCIAL

## 2024-01-13 VITALS
OXYGEN SATURATION: 95 % | SYSTOLIC BLOOD PRESSURE: 135 MMHG | WEIGHT: 174 LBS | DIASTOLIC BLOOD PRESSURE: 92 MMHG | HEIGHT: 69 IN | HEART RATE: 92 BPM | TEMPERATURE: 97.6 F | RESPIRATION RATE: 16 BRPM | BODY MASS INDEX: 25.77 KG/M2

## 2024-01-13 DIAGNOSIS — Z20.2 EXPOSURE TO STD: Primary | ICD-10-CM

## 2024-01-13 LAB
BILIRUB UR QL STRIP: NEGATIVE
CLARITY UR: CLEAR
COLOR UR: YELLOW
GLUCOSE UR STRIP-MCNC: NEGATIVE MG/DL
HGB UR QL STRIP.AUTO: NEGATIVE
KETONES UR QL STRIP: NEGATIVE
LEUKOCYTE ESTERASE UR QL STRIP.AUTO: NEGATIVE
NITRITE UR QL STRIP: NEGATIVE
PH UR STRIP.AUTO: 6.5 [PH] (ref 5–8)
PROT UR QL STRIP: NEGATIVE
SP GR UR STRIP: 1.02 (ref 1–1.03)
UROBILINOGEN UR QL STRIP: NORMAL

## 2024-01-13 PROCEDURE — 96372 THER/PROPH/DIAG INJ SC/IM: CPT

## 2024-01-13 PROCEDURE — 25010000002 CEFTRIAXONE PER 250 MG

## 2024-01-13 PROCEDURE — 87591 N.GONORRHOEAE DNA AMP PROB: CPT

## 2024-01-13 PROCEDURE — 81003 URINALYSIS AUTO W/O SCOPE: CPT

## 2024-01-13 PROCEDURE — 99283 EMERGENCY DEPT VISIT LOW MDM: CPT

## 2024-01-13 PROCEDURE — 87491 CHLMYD TRACH DNA AMP PROBE: CPT

## 2024-01-13 RX ORDER — DOXYCYCLINE 100 MG/1
100 CAPSULE ORAL 2 TIMES DAILY
Qty: 14 CAPSULE | Refills: 0 | Status: SHIPPED | OUTPATIENT
Start: 2024-01-13 | End: 2024-01-20

## 2024-01-13 RX ADMIN — LIDOCAINE HYDROCHLORIDE 500 MG: 10 INJECTION, SOLUTION EPIDURAL; INFILTRATION; INTRACAUDAL; PERINEURAL at 16:13

## 2024-01-13 NOTE — DISCHARGE INSTRUCTIONS
Today you are seen in the ER for your symptoms.  You were given a shot of antibiotic in the emergency department.  Antibiotics were also prescribed at discharge.  You will be notified of results if these are positive.  Please return the ER for any new or worsening symptoms.  Please follow-up with primary care provider soon as possible to reassess symptoms.

## 2024-01-13 NOTE — ED PROVIDER NOTES
Subjective   History of Present Illness  Patient is a 33-year-old male who presents emergency department with complaints of possible STD exposure.  He thinks that he could be exposed to gonorrhea from his girlfriend.  He is complaining of itching in his urethra.  He denies any burning sensation when he is urinating.  Denies any new lesions or open sores.  Denies any further symptoms.        Review of Systems   All other systems reviewed and are negative.      No past medical history on file.    No Known Allergies    No past surgical history on file.    No family history on file.    Social History     Socioeconomic History    Marital status: Single           Objective   Physical Exam  Vitals and nursing note reviewed. Exam conducted with a chaperone present.   Constitutional:       General: He is not in acute distress.     Appearance: Normal appearance. He is normal weight. He is not ill-appearing or toxic-appearing.   HENT:      Head: Normocephalic.   Cardiovascular:      Rate and Rhythm: Normal rate.   Pulmonary:      Effort: Pulmonary effort is normal.   Genitourinary:     Penis: Normal.       Testes: Normal.      Comments: No penile discharge present.  No erythema seen.  No lesions or sores present.  Musculoskeletal:         General: Normal range of motion.      Cervical back: Normal range of motion and neck supple.   Skin:     General: Skin is warm and dry.   Neurological:      General: No focal deficit present.      Mental Status: He is alert and oriented to person, place, and time. Mental status is at baseline.   Psychiatric:         Mood and Affect: Mood normal.         Behavior: Behavior normal.         Thought Content: Thought content normal.         Judgment: Judgment normal.         Procedures           ED Course                                             Medical Decision Making  Patient is a 33-year-old male who presents emergency department with complaints of possible STD exposure.  He thinks that he  could be exposed to gonorrhea from his girlfriend.  He is complaining of itching in his urethra.  He denies any burning sensation when he is urinating.  Denies any new lesions or open sores.  Denies any further symptoms.    Patient not ill-appearing on arrival.  Vital signs stable.  Differential diagnoses include urinary tract infection, chlamydia, gonorrhea, other.  Given this, urinalysis and gonorrhea/chlamydia PCR was ordered.  This was reviewed.  Urinalysis negative.  Gonorrhea/chlamydia PCR pending at this time.  Patient was empirically treated with IM Rocephin in the emergency department.  He was also discharged home with p.o. doxycycline for empiric treatment.  Informed patient that he will be notified of the results of the gonorrhea/chlamydia PCR if positive.  Patient was advised to follow-up with primary care provider soon as possible to reassess symptoms.  Advised to return the ER for any new or worsening symptoms.  Patient verbalized understanding of discharge instructions and agreed with them.  Patient discharged in stable condition.    Problems Addressed:  Exposure to STD: complicated acute illness or injury    Risk  Prescription drug management.        Final diagnoses:   Exposure to STD       ED Disposition  ED Disposition       ED Disposition   Discharge    Condition   Stable    Comment   --               Radha Green, APRN  [address] [address]  [phone]    Schedule an appointment as soon as possible for a visit in 1 day      [facility] EMERGENCY DEPARTMENT  [address] [place] [address] [phone]  Go to   If symptoms worsen         Medication List        New Prescriptions      doxycycline 100 MG capsule  Commonly known as: MONODOX  Take 1 capsule by mouth 2 (Two) Times a Day for 7 days.               Where to Get Your Medications        These medications were sent to [id number] - [place], [place] - [address] [place] [address] AT [facility]  [address] - [phone]  - [phone] FX  [address], [facility] [address]      Phone: [phone]   doxycycline 100 MG capsule            Charlene Davis, APRN  01/13/24 8507

## 2024-06-05 ENCOUNTER — HOSPITAL ENCOUNTER (EMERGENCY)
Facility: HOSPITAL | Age: 34
Discharge: HOME | End: 2024-06-05
Admitting: EMERGENCY MEDICINE
Payer: COMMERCIAL

## 2024-06-05 VITALS
TEMPERATURE: 97.8 F | HEART RATE: 62 BPM | RESPIRATION RATE: 17 BRPM | SYSTOLIC BLOOD PRESSURE: 121 MMHG | HEIGHT: 69 IN | BODY MASS INDEX: 24.96 KG/M2 | WEIGHT: 168.5 LBS | OXYGEN SATURATION: 100 % | DIASTOLIC BLOOD PRESSURE: 92 MMHG

## 2024-06-05 DIAGNOSIS — B35.1 FUNGAL TOENAIL INFECTION: Primary | ICD-10-CM

## 2024-06-05 PROCEDURE — 99282 EMERGENCY DEPT VISIT SF MDM: CPT

## 2024-06-05 RX ORDER — CEPHALEXIN 500 MG/1
500 CAPSULE ORAL 2 TIMES DAILY
Qty: 14 CAPSULE | Refills: 0 | Status: SHIPPED | OUTPATIENT
Start: 2024-06-05 | End: 2024-06-12

## 2024-06-05 RX ORDER — CLOTRIMAZOLE AND BETAMETHASONE DIPROPIONATE 10; .64 MG/G; MG/G
1 CREAM TOPICAL 2 TIMES DAILY
Qty: 45 G | Refills: 0 | Status: SHIPPED | OUTPATIENT
Start: 2024-06-05 | End: 2024-06-19

## 2024-06-05 NOTE — ED PROVIDER NOTES
"Subjective   History of Present Illness    Patient is a 33-year-old male presenting to ED with toenail problem.  PMH unremarkable.  Patient states approximately a year ago he began noticing abnormalities to his bilateral great toenails which have progressively worsened including discoloration on the distal aspect as well as abnormal thickness.  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  Patient states that upon going home each night he will dry his feet off however they do retain moisture throughout the day.  Patient denies any difficulties with the remainder of 4 toenails on both of his feet or any abnormalities with his fingernails.  Patient denies fevers, chills, diaphoresis, arthralgias, or myalgias.  Patient states he tried an over-the-counter topical \"nail polish Proclearz\" to try to help the nails with no relief.  Patient did state that due to the thickness of the nails he could no longer tolerate it for which he began using a Dremel tool however this did not help.  Patient has never had to follow-up with a podiatrist.  Patient states that intermittently they will become uncomfortable for which he presents at this time for further evaluation.    Records reviewed show patient with no previous outpatient visits.    Patient last seen in the ED on 1/13/2024 for exposure to STD.    Patient with no previous podiatry or toe evaluations.    Review of Systems   Constitutional: Negative.  Negative for chills, diaphoresis and fever.   HENT: Negative.     Eyes: Negative.    Respiratory: Negative.     Cardiovascular: Negative.    Gastrointestinal: Negative.  Negative for nausea and vomiting.   Genitourinary: Negative.    Musculoskeletal: Negative.  Negative for arthralgias, gait problem and myalgias.   Skin:  Positive for color change (Discolored toes). Negative for wound.   Allergic/Immunologic: " Negative for immunocompromised state.   Neurological: Negative.    Psychiatric/Behavioral: Negative.     All other systems reviewed and are negative.      No past medical history on file.    No Known Allergies    No past surgical history on file.    No family history on file.    Social History     Socioeconomic History    Marital status: Single           Objective   Physical Exam  Vitals and nursing note reviewed.   Constitutional:       General: He is not in acute distress.     Appearance: Normal appearance. He is normal weight. He is not ill-appearing, toxic-appearing or diaphoretic.   HENT:      Head: Normocephalic.      Mouth/Throat:      Mouth: Mucous membranes are moist.   Eyes:      Pupils: Pupils are equal, round, and reactive to light.   Cardiovascular:      Pulses: Normal pulses.   Pulmonary:      Effort: Pulmonary effort is normal.   Musculoskeletal:         General: No tenderness or signs of injury. Normal range of motion.      Cervical back: Neck supple.      Right lower leg: No edema.      Left lower leg: No edema.   Feet:      Right foot:      Skin integrity: Callus and dry skin present. No ulcer, blister, skin breakdown, erythema, warmth or fissure.      Toenail Condition: Right toenails are abnormally thick. Fungal disease present.     Left foot:      Skin integrity: Callus and dry skin present. No ulcer, blister, skin breakdown, erythema, warmth or fissure.      Toenail Condition: Left toenails are abnormally thick. Fungal disease present.  Skin:     General: Skin is warm and dry.   Neurological:      Mental Status: He is alert and oriented to person, place, and time.      Gait: Gait normal.   Psychiatric:         Mood and Affect: Mood normal.         Behavior: Behavior normal.         Procedures           ED Course                                             Medical Decision Making  Problems Addressed:  Fungal toenail infection: complicated acute illness or injury    Amount and/or Complexity of  Data Reviewed  External Data Reviewed: labs, radiology and notes.    Risk  Prescription drug management.      Patient is a 33-year-old male presenting to ED with toenail problem.  PMH unremarkable.  Upon initial valuation patient resting comfortably on the edge of the bed in no acute distress.  Patient is nontoxic-appearing, non-ill-appearing, nondiaphoretic.  Patient with stable vital signs.  Examination finds no MSK abnormalities.  Examination does reveal evidence of abnormally thick, broken down, fungal diseased bilateral great toenails with no abnormalities to any of the lesser toenails.  Normal inspection of the fingernails.  There is no surrounding or streaking erythema to either great toenail, no further signs of infection.  No abnormalities to the plantar aspect of the foot, and no further skin defects.  Discussed with patient likely fungal disease.  Advised need for topical antifungal ointment and close outpatient follow-up with primary care provider or podiatry within the next 48 hours as well strict return precautions need for immediate return to ED should he develop any new or worsening symptoms.  Discussed with patient that these can sometimes be treatment resistant and require a long course of treatment for which is important that he follows with his podiatrist or primary care provider.  Patient continues to be hemodynamically stable, ambulating without difficulty.  Patient is very appreciative and amenable to this treatment plan with no further questions, concerns, needs at this time and is stable for discharge.    Differential diagnosis: Ingrown toenail, fungal disease, subungual hematoma, felon, paronychia, other.    Final diagnoses:   Fungal toenail infection       ED Disposition  ED Disposition       ED Disposition   Discharge    Condition   Stable    Comment   --               Radha Green, APRN  [address] [address]  [phone]    Schedule an appointment as soon as  possible for a visit in 2 days      Yusuf Stevenson, JORDAN  [address]   [address]  [facility] [id number] [phone]    Call in 2 days      [facility] EMERGENCY DEPARTMENT  [address]  [facility] [address] [phone]    As needed         Medication List        New Prescriptions      cephalexin 500 MG capsule  Commonly known as: KEFLEX  Take 1 capsule by mouth 2 (Two) Times a Day for 7 days.     clotrimazole-betamethasone 1-0.05 % cream  Commonly known as: LOTRISONE  Apply 1 Application topically to the appropriate area as directed 2 (Two) Times a Day for 14 days.               Where to Get Your Medications        These medications were sent to [facility] #[id number] - [place], [place] - [address] AT [address] & [facility] [phone] [facility] [phone] FX  [address] [facility] [place] [address]      Phone: [phone]   cephalexin 500 MG capsule  clotrimazole-betamethasone 1-0.05 % cream            Oliver Prater PA-C  06/05/24 4911

## 2024-06-05 NOTE — ED PROVIDER NOTES
Subjective   History of Present Illness    Patient is a 33-year-old male presenting to ED with need for medication refill.    Records reviewed show patient    Review of Systems    No past medical history on file.    No Known Allergies    No past surgical history on file.    No family history on file.    Social History     Socioeconomic History   • Marital status: Single           Objective   Physical Exam    Procedures           ED Course                                             Medical Decision Making      Final diagnoses:   None       ED Disposition  ED Disposition       None            No follow-up provider specified.       Medication List      No changes were made to your prescriptions during this visit.

## 2024-06-05 NOTE — DISCHARGE INSTRUCTIONS
As we discussed please make sure that you keep your feet clean and dry and whenever they develop moisture, such as being sweaty or wet, you change her socks and dry them immediately.  Please use the topical antifungal twice daily.  You will need to follow-up with your primary care provider or the foot doctor for reevaluation within the next 48 hours.  Should you develop any new or worsening symptoms please return to the ER for further evaluation.

## 2024-08-15 ENCOUNTER — HOSPITAL ENCOUNTER (EMERGENCY)
Age: 34
Discharge: LEFT BEFORE DISCHARGE | End: 2024-08-15
Payer: MEDICAID

## 2024-08-15 VITALS
WEIGHT: 170 LBS | RESPIRATION RATE: 18 BRPM | BODY MASS INDEX: 25.18 KG/M2 | OXYGEN SATURATION: 98 % | HEIGHT: 69 IN | DIASTOLIC BLOOD PRESSURE: 86 MMHG | TEMPERATURE: 98.4 F | SYSTOLIC BLOOD PRESSURE: 141 MMHG | HEART RATE: 104 BPM

## 2024-08-15 LAB — S PYO AG THROAT QL: NEGATIVE

## 2024-08-15 PROCEDURE — 87081 CULTURE SCREEN ONLY: CPT

## 2024-08-15 PROCEDURE — 4500000002 HC ER NO CHARGE

## 2024-08-15 PROCEDURE — 87880 STREP A ASSAY W/OPTIC: CPT

## 2024-08-17 LAB
ORGANISM: ABNORMAL
S PYO THROAT QL CULT: ABNORMAL
S PYO THROAT QL CULT: ABNORMAL